# Patient Record
Sex: FEMALE | Race: WHITE | NOT HISPANIC OR LATINO | Employment: OTHER | URBAN - METROPOLITAN AREA
[De-identification: names, ages, dates, MRNs, and addresses within clinical notes are randomized per-mention and may not be internally consistent; named-entity substitution may affect disease eponyms.]

---

## 2023-01-01 ENCOUNTER — APPOINTMENT (OUTPATIENT)
Dept: CARDIOLOGY | Facility: HOSPITAL | Age: 88
DRG: 064 | End: 2023-01-01
Payer: COMMERCIAL

## 2023-01-01 ENCOUNTER — APPOINTMENT (OUTPATIENT)
Dept: RADIOLOGY | Facility: HOSPITAL | Age: 88
DRG: 064 | End: 2023-01-01
Payer: COMMERCIAL

## 2023-01-01 ENCOUNTER — HOSPITAL ENCOUNTER (INPATIENT)
Facility: HOSPITAL | Age: 88
LOS: 4 days | DRG: 064 | End: 2023-10-18
Attending: EMERGENCY MEDICINE | Admitting: INTERNAL MEDICINE
Payer: COMMERCIAL

## 2023-01-01 VITALS
TEMPERATURE: 98.4 F | DIASTOLIC BLOOD PRESSURE: 60 MMHG | BODY MASS INDEX: 23.98 KG/M2 | SYSTOLIC BLOOD PRESSURE: 129 MMHG | HEART RATE: 68 BPM | OXYGEN SATURATION: 90 % | HEIGHT: 63 IN | RESPIRATION RATE: 16 BRPM | WEIGHT: 135.36 LBS

## 2023-01-01 DIAGNOSIS — I63.9 CEREBROVASCULAR ACCIDENT (CVA), UNSPECIFIED MECHANISM (MULTI): Primary | ICD-10-CM

## 2023-01-01 LAB
ALBUMIN SERPL BCP-MCNC: 2.9 G/DL (ref 3.4–5)
ALBUMIN SERPL BCP-MCNC: 3.1 G/DL (ref 3.4–5)
ALP SERPL-CCNC: 158 U/L (ref 33–136)
ALP SERPL-CCNC: 169 U/L (ref 33–136)
ALT SERPL W P-5'-P-CCNC: 19 U/L (ref 7–45)
ALT SERPL W P-5'-P-CCNC: 24 U/L (ref 7–45)
ANION GAP BLDV CALCULATED.4IONS-SCNC: 10 MMOL/L (ref 10–25)
ANION GAP BLDV CALCULATED.4IONS-SCNC: 11 MMOL/L (ref 10–25)
ANION GAP SERPL CALC-SCNC: 12 MMOL/L (ref 10–20)
ANION GAP SERPL CALC-SCNC: 17 MMOL/L (ref 10–20)
ANION GAP SERPL CALC-SCNC: 17 MMOL/L (ref 10–20)
APPEARANCE UR: CLEAR
APTT PPP: 32 SECONDS (ref 27–38)
AST SERPL W P-5'-P-CCNC: 30 U/L (ref 9–39)
AST SERPL W P-5'-P-CCNC: 30 U/L (ref 9–39)
BASE EXCESS BLDV CALC-SCNC: 4.7 MMOL/L (ref -2–3)
BASE EXCESS BLDV CALC-SCNC: 5.8 MMOL/L (ref -2–3)
BASOPHILS # BLD AUTO: 0.05 X10*3/UL (ref 0–0.1)
BASOPHILS NFR BLD AUTO: 0.5 %
BILIRUB SERPL-MCNC: 0.7 MG/DL (ref 0–1.2)
BILIRUB SERPL-MCNC: 0.9 MG/DL (ref 0–1.2)
BILIRUB UR STRIP.AUTO-MCNC: NEGATIVE MG/DL
BNP SERPL-MCNC: 709 PG/ML (ref 0–99)
BODY TEMPERATURE: 37 DEGREES CELSIUS
BODY TEMPERATURE: 37 DEGREES CELSIUS
BUN SERPL-MCNC: 22 MG/DL (ref 6–23)
BUN SERPL-MCNC: 25 MG/DL (ref 6–23)
BUN SERPL-MCNC: 29 MG/DL (ref 6–23)
CA-I BLDV-SCNC: 1.1 MMOL/L (ref 1.1–1.33)
CA-I BLDV-SCNC: 1.15 MMOL/L (ref 1.1–1.33)
CALCIUM SERPL-MCNC: 8.7 MG/DL (ref 8.6–10.3)
CALCIUM SERPL-MCNC: 9 MG/DL (ref 8.6–10.3)
CALCIUM SERPL-MCNC: 9 MG/DL (ref 8.6–10.3)
CARDIAC TROPONIN I PNL SERPL HS: 24 NG/L (ref 0–13)
CHLORIDE BLDV-SCNC: 100 MMOL/L (ref 98–107)
CHLORIDE BLDV-SCNC: 99 MMOL/L (ref 98–107)
CHLORIDE SERPL-SCNC: 98 MMOL/L (ref 98–107)
CHOLEST SERPL-MCNC: 132 MG/DL (ref 0–199)
CHOLESTEROL/HDL RATIO: 3.3
CO2 SERPL-SCNC: 26 MMOL/L (ref 21–32)
CO2 SERPL-SCNC: 29 MMOL/L (ref 21–32)
CO2 SERPL-SCNC: 31 MMOL/L (ref 21–32)
COLOR UR: YELLOW
CREAT SERPL-MCNC: 0.78 MG/DL (ref 0.5–1.05)
CREAT SERPL-MCNC: 0.8 MG/DL (ref 0.5–1.05)
CREAT SERPL-MCNC: 0.82 MG/DL (ref 0.5–1.05)
EJECTION FRACTION APICAL 4 CHAMBER: 58.5
EOSINOPHIL # BLD AUTO: 0.23 X10*3/UL (ref 0–0.4)
EOSINOPHIL NFR BLD AUTO: 2.5 %
ERYTHROCYTE [DISTWIDTH] IN BLOOD BY AUTOMATED COUNT: 15.9 % (ref 11.5–14.5)
ERYTHROCYTE [DISTWIDTH] IN BLOOD BY AUTOMATED COUNT: 16.1 % (ref 11.5–14.5)
ERYTHROCYTE [DISTWIDTH] IN BLOOD BY AUTOMATED COUNT: 16.3 % (ref 11.5–14.5)
EST. AVERAGE GLUCOSE BLD GHB EST-MCNC: 143 MG/DL
FLOW: 3 LPM
GFR SERPL CREATININE-BSD FRML MDRD: 66 ML/MIN/1.73M*2
GFR SERPL CREATININE-BSD FRML MDRD: 68 ML/MIN/1.73M*2
GFR SERPL CREATININE-BSD FRML MDRD: 70 ML/MIN/1.73M*2
GLUCOSE BLD MANUAL STRIP-MCNC: 98 MG/DL (ref 74–99)
GLUCOSE BLDV-MCNC: 83 MG/DL (ref 74–99)
GLUCOSE BLDV-MCNC: 86 MG/DL (ref 74–99)
GLUCOSE SERPL-MCNC: 105 MG/DL (ref 74–99)
GLUCOSE SERPL-MCNC: 78 MG/DL (ref 74–99)
GLUCOSE SERPL-MCNC: 93 MG/DL (ref 74–99)
GLUCOSE UR STRIP.AUTO-MCNC: NEGATIVE MG/DL
HBA1C MFR BLD: 6.6 %
HCO3 BLDV-SCNC: 28.2 MMOL/L (ref 22–26)
HCO3 BLDV-SCNC: 29.2 MMOL/L (ref 22–26)
HCT VFR BLD AUTO: 32.8 % (ref 36–46)
HCT VFR BLD AUTO: 36.9 % (ref 36–46)
HCT VFR BLD AUTO: 40.4 % (ref 36–46)
HCT VFR BLD EST: 36 % (ref 36–46)
HCT VFR BLD EST: 38 % (ref 36–46)
HDLC SERPL-MCNC: 40.2 MG/DL
HGB BLD-MCNC: 10.4 G/DL (ref 12–16)
HGB BLD-MCNC: 11.9 G/DL (ref 12–16)
HGB BLD-MCNC: 12.8 G/DL (ref 12–16)
HGB BLDV-MCNC: 12.1 G/DL (ref 12–16)
HGB BLDV-MCNC: 12.6 G/DL (ref 12–16)
IMM GRANULOCYTES # BLD AUTO: 0.05 X10*3/UL (ref 0–0.5)
IMM GRANULOCYTES NFR BLD AUTO: 0.5 % (ref 0–0.9)
INHALED O2 CONCENTRATION: 32 %
INHALED O2 CONCENTRATION: 32 %
INR PPP: 1.1 (ref 0.9–1.1)
KETONES UR STRIP.AUTO-MCNC: NEGATIVE MG/DL
LACTATE BLDV-SCNC: 1.4 MMOL/L (ref 0.4–2)
LACTATE BLDV-SCNC: 1.4 MMOL/L (ref 0.4–2)
LDLC SERPL CALC-MCNC: 77 MG/DL
LEUKOCYTE ESTERASE UR QL STRIP.AUTO: ABNORMAL
LYMPHOCYTES # BLD AUTO: 1.79 X10*3/UL (ref 0.8–3)
LYMPHOCYTES NFR BLD AUTO: 19.1 %
MAGNESIUM SERPL-MCNC: 1.91 MG/DL (ref 1.6–2.4)
MAGNESIUM SERPL-MCNC: 1.98 MG/DL (ref 1.6–2.4)
MCH RBC QN AUTO: 28.2 PG (ref 26–34)
MCH RBC QN AUTO: 28.2 PG (ref 26–34)
MCH RBC QN AUTO: 28.5 PG (ref 26–34)
MCHC RBC AUTO-ENTMCNC: 31.7 G/DL (ref 32–36)
MCHC RBC AUTO-ENTMCNC: 31.7 G/DL (ref 32–36)
MCHC RBC AUTO-ENTMCNC: 32.2 G/DL (ref 32–36)
MCV RBC AUTO: 89 FL (ref 80–100)
MONOCYTES # BLD AUTO: 0.75 X10*3/UL (ref 0.05–0.8)
MONOCYTES NFR BLD AUTO: 8 %
NEUTROPHILS # BLD AUTO: 6.48 X10*3/UL (ref 1.6–5.5)
NEUTROPHILS NFR BLD AUTO: 69.4 %
NITRITE UR QL STRIP.AUTO: NEGATIVE
NON HDL CHOLESTEROL: 92 MG/DL (ref 0–149)
NRBC BLD-RTO: 0 /100 WBCS (ref 0–0)
OXYHGB MFR BLDV: 84.4 % (ref 45–75)
OXYHGB MFR BLDV: 95.3 % (ref 45–75)
PCO2 BLDV: 33 MM HG (ref 41–51)
PCO2 BLDV: 42 MM HG (ref 41–51)
PH BLDV: 7.45 PH (ref 7.33–7.43)
PH BLDV: 7.54 PH (ref 7.33–7.43)
PH UR STRIP.AUTO: 6 [PH]
PLATELET # BLD AUTO: 283 X10*3/UL (ref 150–450)
PLATELET # BLD AUTO: 303 X10*3/UL (ref 150–450)
PLATELET # BLD AUTO: 317 X10*3/UL (ref 150–450)
PMV BLD AUTO: 8.9 FL (ref 7.5–11.5)
PMV BLD AUTO: 9.1 FL (ref 7.5–11.5)
PMV BLD AUTO: 9.4 FL (ref 7.5–11.5)
PO2 BLDV: 57 MM HG (ref 35–45)
PO2 BLDV: 82 MM HG (ref 35–45)
POCT GLUCOSE: 129 MG/DL (ref 74–99)
POTASSIUM BLDV-SCNC: 3.4 MMOL/L (ref 3.5–5.3)
POTASSIUM BLDV-SCNC: 3.8 MMOL/L (ref 3.5–5.3)
POTASSIUM SERPL-SCNC: 3.4 MMOL/L (ref 3.5–5.3)
POTASSIUM SERPL-SCNC: 3.7 MMOL/L (ref 3.5–5.3)
POTASSIUM SERPL-SCNC: 3.7 MMOL/L (ref 3.5–5.3)
PROT SERPL-MCNC: 6.5 G/DL (ref 6.4–8.2)
PROT SERPL-MCNC: 6.8 G/DL (ref 6.4–8.2)
PROT UR STRIP.AUTO-MCNC: NEGATIVE MG/DL
PROTHROMBIN TIME: 12.9 SECONDS (ref 9.8–12.8)
RBC # BLD AUTO: 3.69 X10*6/UL (ref 4–5.2)
RBC # BLD AUTO: 4.17 X10*6/UL (ref 4–5.2)
RBC # BLD AUTO: 4.54 X10*6/UL (ref 4–5.2)
RBC # UR STRIP.AUTO: ABNORMAL /UL
RBC #/AREA URNS AUTO: NORMAL /HPF
SAO2 % BLDV: 87 % (ref 45–75)
SAO2 % BLDV: 98 % (ref 45–75)
SODIUM BLDV-SCNC: 134 MMOL/L (ref 136–145)
SODIUM BLDV-SCNC: 136 MMOL/L (ref 136–145)
SODIUM SERPL-SCNC: 135 MMOL/L (ref 136–145)
SODIUM SERPL-SCNC: 138 MMOL/L (ref 136–145)
SODIUM SERPL-SCNC: 142 MMOL/L (ref 136–145)
SP GR UR STRIP.AUTO: 1.01
TRIGL SERPL-MCNC: 72 MG/DL (ref 0–149)
UROBILINOGEN UR STRIP.AUTO-MCNC: <2 MG/DL
VLDL: 14 MG/DL (ref 0–40)
WBC # BLD AUTO: 13.7 X10*3/UL (ref 4.4–11.3)
WBC # BLD AUTO: 15 X10*3/UL (ref 4.4–11.3)
WBC # BLD AUTO: 9.4 X10*3/UL (ref 4.4–11.3)
WBC #/AREA URNS AUTO: NORMAL /HPF

## 2023-01-01 PROCEDURE — 82330 ASSAY OF CALCIUM: CPT | Performed by: STUDENT IN AN ORGANIZED HEALTH CARE EDUCATION/TRAINING PROGRAM

## 2023-01-01 PROCEDURE — 80061 LIPID PANEL: CPT | Performed by: INTERNAL MEDICINE

## 2023-01-01 PROCEDURE — 2060000001 HC INTERMEDIATE ICU ROOM DAILY

## 2023-01-01 PROCEDURE — 36415 COLL VENOUS BLD VENIPUNCTURE: CPT | Performed by: STUDENT IN AN ORGANIZED HEALTH CARE EDUCATION/TRAINING PROGRAM

## 2023-01-01 PROCEDURE — 97166 OT EVAL MOD COMPLEX 45 MIN: CPT | Mod: GO | Performed by: OCCUPATIONAL THERAPIST

## 2023-01-01 PROCEDURE — 83880 ASSAY OF NATRIURETIC PEPTIDE: CPT | Performed by: STUDENT IN AN ORGANIZED HEALTH CARE EDUCATION/TRAINING PROGRAM

## 2023-01-01 PROCEDURE — 2580000001 HC RX 258 IV SOLUTIONS: Performed by: INTERNAL MEDICINE

## 2023-01-01 PROCEDURE — 36415 COLL VENOUS BLD VENIPUNCTURE: CPT | Performed by: INTERNAL MEDICINE

## 2023-01-01 PROCEDURE — 2500000004 HC RX 250 GENERAL PHARMACY W/ HCPCS (ALT 636 FOR OP/ED): Performed by: INTERNAL MEDICINE

## 2023-01-01 PROCEDURE — 99223 1ST HOSP IP/OBS HIGH 75: CPT | Performed by: INTERNAL MEDICINE

## 2023-01-01 PROCEDURE — 87086 URINE CULTURE/COLONY COUNT: CPT | Mod: CMCLAB,PORLAB | Performed by: INTERNAL MEDICINE

## 2023-01-01 PROCEDURE — 2550000001 HC RX 255 CONTRASTS: Performed by: EMERGENCY MEDICINE

## 2023-01-01 PROCEDURE — 97162 PT EVAL MOD COMPLEX 30 MIN: CPT | Mod: GP

## 2023-01-01 PROCEDURE — 92610 EVALUATE SWALLOWING FUNCTION: CPT | Mod: GN | Performed by: PHARMACIST

## 2023-01-01 PROCEDURE — 81001 URINALYSIS AUTO W/SCOPE: CPT | Performed by: INTERNAL MEDICINE

## 2023-01-01 PROCEDURE — 80053 COMPREHEN METABOLIC PANEL: CPT | Performed by: EMERGENCY MEDICINE

## 2023-01-01 PROCEDURE — 70496 CT ANGIOGRAPHY HEAD: CPT | Mod: MG

## 2023-01-01 PROCEDURE — 71045 X-RAY EXAM CHEST 1 VIEW: CPT

## 2023-01-01 PROCEDURE — 70450 CT HEAD/BRAIN W/O DYE: CPT | Performed by: STUDENT IN AN ORGANIZED HEALTH CARE EDUCATION/TRAINING PROGRAM

## 2023-01-01 PROCEDURE — 87075 CULTR BACTERIA EXCEPT BLOOD: CPT | Mod: CMCLAB,PORLAB | Performed by: INTERNAL MEDICINE

## 2023-01-01 PROCEDURE — 70496 CT ANGIOGRAPHY HEAD: CPT | Performed by: RADIOLOGY

## 2023-01-01 PROCEDURE — 99233 SBSQ HOSP IP/OBS HIGH 50: CPT | Performed by: NURSE PRACTITIONER

## 2023-01-01 PROCEDURE — 71045 X-RAY EXAM CHEST 1 VIEW: CPT | Performed by: RADIOLOGY

## 2023-01-01 PROCEDURE — 83735 ASSAY OF MAGNESIUM: CPT | Performed by: INTERNAL MEDICINE

## 2023-01-01 PROCEDURE — 85610 PROTHROMBIN TIME: CPT | Performed by: EMERGENCY MEDICINE

## 2023-01-01 PROCEDURE — 99291 CRITICAL CARE FIRST HOUR: CPT | Performed by: EMERGENCY MEDICINE

## 2023-01-01 PROCEDURE — 99497 ADVNCD CARE PLAN 30 MIN: CPT | Performed by: INTERNAL MEDICINE

## 2023-01-01 PROCEDURE — 85027 COMPLETE CBC AUTOMATED: CPT | Performed by: INTERNAL MEDICINE

## 2023-01-01 PROCEDURE — 84295 ASSAY OF SERUM SODIUM: CPT | Performed by: STUDENT IN AN ORGANIZED HEALTH CARE EDUCATION/TRAINING PROGRAM

## 2023-01-01 PROCEDURE — 99233 SBSQ HOSP IP/OBS HIGH 50: CPT | Performed by: INTERNAL MEDICINE

## 2023-01-01 PROCEDURE — 99223 1ST HOSP IP/OBS HIGH 75: CPT | Performed by: PSYCHIATRY & NEUROLOGY

## 2023-01-01 PROCEDURE — 85025 COMPLETE CBC W/AUTO DIFF WBC: CPT | Performed by: EMERGENCY MEDICINE

## 2023-01-01 PROCEDURE — 87040 BLOOD CULTURE FOR BACTERIA: CPT | Mod: CMCLAB,PORLAB | Performed by: INTERNAL MEDICINE

## 2023-01-01 PROCEDURE — 93306 TTE W/DOPPLER COMPLETE: CPT

## 2023-01-01 PROCEDURE — 70450 CT HEAD/BRAIN W/O DYE: CPT | Performed by: RADIOLOGY

## 2023-01-01 PROCEDURE — 94762 N-INVAS EAR/PLS OXIMTRY CONT: CPT

## 2023-01-01 PROCEDURE — 70498 CT ANGIOGRAPHY NECK: CPT | Performed by: RADIOLOGY

## 2023-01-01 PROCEDURE — 80048 BASIC METABOLIC PNL TOTAL CA: CPT | Performed by: INTERNAL MEDICINE

## 2023-01-01 PROCEDURE — 93306 TTE W/DOPPLER COMPLETE: CPT | Performed by: INTERNAL MEDICINE

## 2023-01-01 PROCEDURE — 70498 CT ANGIOGRAPHY NECK: CPT | Mod: MG

## 2023-01-01 PROCEDURE — 83036 HEMOGLOBIN GLYCOSYLATED A1C: CPT | Performed by: INTERNAL MEDICINE

## 2023-01-01 PROCEDURE — 84295 ASSAY OF SERUM SODIUM: CPT | Performed by: INTERNAL MEDICINE

## 2023-01-01 PROCEDURE — 82805 BLOOD GASES W/O2 SATURATION: CPT | Performed by: STUDENT IN AN ORGANIZED HEALTH CARE EDUCATION/TRAINING PROGRAM

## 2023-01-01 PROCEDURE — 2500000004 HC RX 250 GENERAL PHARMACY W/ HCPCS (ALT 636 FOR OP/ED): Performed by: STUDENT IN AN ORGANIZED HEALTH CARE EDUCATION/TRAINING PROGRAM

## 2023-01-01 PROCEDURE — 85730 THROMBOPLASTIN TIME PARTIAL: CPT | Performed by: EMERGENCY MEDICINE

## 2023-01-01 PROCEDURE — 70450 CT HEAD/BRAIN W/O DYE: CPT | Mod: MG

## 2023-01-01 PROCEDURE — 36415 COLL VENOUS BLD VENIPUNCTURE: CPT | Performed by: EMERGENCY MEDICINE

## 2023-01-01 PROCEDURE — 1210000001 HC SEMI-PRIVATE ROOM DAILY

## 2023-01-01 PROCEDURE — 84484 ASSAY OF TROPONIN QUANT: CPT | Performed by: EMERGENCY MEDICINE

## 2023-01-01 PROCEDURE — 82947 ASSAY GLUCOSE BLOOD QUANT: CPT

## 2023-01-01 RX ORDER — CLOPIDOGREL BISULFATE 75 MG/1
75 TABLET ORAL DAILY
Status: DISCONTINUED | OUTPATIENT
Start: 2023-01-01 | End: 2023-01-01

## 2023-01-01 RX ORDER — FLUTICASONE PROPIONATE AND SALMETEROL 500; 50 UG/1; UG/1
1 POWDER RESPIRATORY (INHALATION)
COMMUNITY

## 2023-01-01 RX ORDER — LABETALOL HYDROCHLORIDE 5 MG/ML
10 INJECTION, SOLUTION INTRAVENOUS EVERY 10 MIN PRN
Status: ACTIVE | OUTPATIENT
Start: 2023-01-01 | End: 2023-01-01

## 2023-01-01 RX ORDER — SERTRALINE HYDROCHLORIDE 50 MG/1
50 TABLET, FILM COATED ORAL DAILY
COMMUNITY

## 2023-01-01 RX ORDER — FERROUS SULFATE 325(65) MG
65 TABLET ORAL
COMMUNITY

## 2023-01-01 RX ORDER — CEFTRIAXONE 1 G/50ML
1 INJECTION, SOLUTION INTRAVENOUS EVERY 24 HOURS
Status: DISCONTINUED | OUTPATIENT
Start: 2023-01-01 | End: 2023-01-01

## 2023-01-01 RX ORDER — LORAZEPAM 2 MG/ML
2 INJECTION INTRAMUSCULAR
Status: DISCONTINUED | OUTPATIENT
Start: 2023-01-01 | End: 2023-10-18 | Stop reason: HOSPADM

## 2023-01-01 RX ORDER — VIBEGRON 75 MG/1
1 TABLET, FILM COATED ORAL DAILY
COMMUNITY

## 2023-01-01 RX ORDER — FUROSEMIDE 10 MG/ML
20 INJECTION INTRAMUSCULAR; INTRAVENOUS EVERY 12 HOURS
Status: DISCONTINUED | OUTPATIENT
Start: 2023-01-01 | End: 2023-01-01

## 2023-01-01 RX ORDER — CLOPIDOGREL BISULFATE 75 MG/1
75 TABLET ORAL DAILY
COMMUNITY

## 2023-01-01 RX ORDER — OMEPRAZOLE 20 MG/1
20 CAPSULE, DELAYED RELEASE ORAL
COMMUNITY

## 2023-01-01 RX ORDER — LORAZEPAM 2 MG/ML
1 INJECTION INTRAMUSCULAR
Status: DISCONTINUED | OUTPATIENT
Start: 2023-01-01 | End: 2023-10-18 | Stop reason: HOSPADM

## 2023-01-01 RX ORDER — SIMETHICONE 80 MG
80 TABLET,CHEWABLE ORAL EVERY 6 HOURS
COMMUNITY

## 2023-01-01 RX ORDER — MORPHINE SULFATE 2 MG/ML
2 INJECTION, SOLUTION INTRAMUSCULAR; INTRAVENOUS ONCE
Status: COMPLETED | OUTPATIENT
Start: 2023-01-01 | End: 2023-01-01

## 2023-01-01 RX ORDER — CETIRIZINE HYDROCHLORIDE 10 MG/1
10 TABLET ORAL DAILY PRN
COMMUNITY

## 2023-01-01 RX ORDER — FUROSEMIDE 40 MG/1
40 TABLET ORAL DAILY
COMMUNITY

## 2023-01-01 RX ORDER — POLYETHYLENE GLYCOL 3350 17 G/17G
17 POWDER, FOR SOLUTION ORAL DAILY
COMMUNITY

## 2023-01-01 RX ORDER — ATORVASTATIN CALCIUM 40 MG/1
80 TABLET, FILM COATED ORAL NIGHTLY
Status: DISCONTINUED | OUTPATIENT
Start: 2023-01-01 | End: 2023-01-01

## 2023-01-01 RX ORDER — FUROSEMIDE 10 MG/ML
20 INJECTION INTRAMUSCULAR; INTRAVENOUS ONCE
Status: COMPLETED | OUTPATIENT
Start: 2023-01-01 | End: 2023-01-01

## 2023-01-01 RX ORDER — POTASSIUM CHLORIDE 20 MEQ/1
20 TABLET, EXTENDED RELEASE ORAL DAILY
COMMUNITY

## 2023-01-01 RX ORDER — ACETAMINOPHEN 325 MG/1
2 TABLET ORAL NIGHTLY
COMMUNITY

## 2023-01-01 RX ORDER — HYDROMORPHONE HYDROCHLORIDE 1 MG/ML
1 INJECTION, SOLUTION INTRAMUSCULAR; INTRAVENOUS; SUBCUTANEOUS
Status: DISCONTINUED | OUTPATIENT
Start: 2023-01-01 | End: 2023-10-18 | Stop reason: HOSPADM

## 2023-01-01 RX ORDER — GLYCOPYRROLATE 0.2 MG/ML
0.2 INJECTION INTRAMUSCULAR; INTRAVENOUS EVERY 4 HOURS PRN
Status: DISCONTINUED | OUTPATIENT
Start: 2023-01-01 | End: 2023-10-18 | Stop reason: HOSPADM

## 2023-01-01 RX ORDER — LEVOTHYROXINE SODIUM 25 UG/1
25 TABLET ORAL
COMMUNITY

## 2023-01-01 RX ORDER — DOCUSATE SODIUM 100 MG/1
100 CAPSULE, LIQUID FILLED ORAL 2 TIMES DAILY
COMMUNITY

## 2023-01-01 RX ORDER — LANOLIN ALCOHOL/MO/W.PET/CERES
1000 CREAM (GRAM) TOPICAL DAILY
COMMUNITY

## 2023-01-01 RX ORDER — HYDRALAZINE HYDROCHLORIDE 25 MG/1
25 TABLET, FILM COATED ORAL EVERY 6 HOURS PRN
Status: DISCONTINUED | OUTPATIENT
Start: 2023-01-01 | End: 2023-01-01

## 2023-01-01 RX ORDER — SODIUM CHLORIDE, SODIUM LACTATE, POTASSIUM CHLORIDE, CALCIUM CHLORIDE 600; 310; 30; 20 MG/100ML; MG/100ML; MG/100ML; MG/100ML
75 INJECTION, SOLUTION INTRAVENOUS CONTINUOUS
Status: DISCONTINUED | OUTPATIENT
Start: 2023-01-01 | End: 2023-01-01

## 2023-01-01 RX ORDER — ACETAMINOPHEN 325 MG/1
325 TABLET ORAL EVERY 4 HOURS PRN
COMMUNITY

## 2023-01-01 RX ORDER — POLYETHYLENE GLYCOL 3350 17 G/17G
17 POWDER, FOR SOLUTION ORAL DAILY PRN
Status: DISCONTINUED | OUTPATIENT
Start: 2023-01-01 | End: 2023-01-01

## 2023-01-01 RX ORDER — HYDRALAZINE HYDROCHLORIDE 20 MG/ML
10 INJECTION INTRAMUSCULAR; INTRAVENOUS
Status: ACTIVE | OUTPATIENT
Start: 2023-01-01 | End: 2023-01-01

## 2023-01-01 RX ORDER — TIOTROPIUM BROMIDE 18 UG/1
1 CAPSULE ORAL; RESPIRATORY (INHALATION)
COMMUNITY

## 2023-01-01 RX ORDER — POTASSIUM CHLORIDE 14.9 MG/ML
20 INJECTION INTRAVENOUS ONCE
Status: COMPLETED | OUTPATIENT
Start: 2023-01-01 | End: 2023-01-01

## 2023-01-01 RX ORDER — ASPIRIN 81 MG/1
81 TABLET ORAL DAILY
Status: DISCONTINUED | OUTPATIENT
Start: 2023-01-01 | End: 2023-01-01

## 2023-01-01 RX ADMIN — HYDROMORPHONE HYDROCHLORIDE 1 MG: 1 INJECTION, SOLUTION INTRAMUSCULAR; INTRAVENOUS; SUBCUTANEOUS at 16:28

## 2023-01-01 RX ADMIN — FUROSEMIDE 20 MG: 10 INJECTION, SOLUTION INTRAMUSCULAR; INTRAVENOUS at 23:14

## 2023-01-01 RX ADMIN — POTASSIUM CHLORIDE 20 MEQ: 14.9 INJECTION, SOLUTION INTRAVENOUS at 10:58

## 2023-01-01 RX ADMIN — FUROSEMIDE 20 MG: 10 INJECTION, SOLUTION INTRAMUSCULAR; INTRAVENOUS at 11:03

## 2023-01-01 RX ADMIN — CEFTRIAXONE SODIUM 1 G: 1 INJECTION, SOLUTION INTRAVENOUS at 08:34

## 2023-01-01 RX ADMIN — FUROSEMIDE 20 MG: 10 INJECTION, SOLUTION INTRAMUSCULAR; INTRAVENOUS at 00:03

## 2023-01-01 RX ADMIN — FUROSEMIDE 20 MG: 10 INJECTION, SOLUTION INTRAMUSCULAR; INTRAVENOUS at 10:58

## 2023-01-01 RX ADMIN — MORPHINE SULFATE 2 MG: 2 INJECTION, SOLUTION INTRAMUSCULAR; INTRAVENOUS at 02:35

## 2023-01-01 RX ADMIN — LORAZEPAM 1 MG: 2 INJECTION INTRAMUSCULAR; INTRAVENOUS at 16:27

## 2023-01-01 RX ADMIN — SODIUM CHLORIDE, POTASSIUM CHLORIDE, SODIUM LACTATE AND CALCIUM CHLORIDE 75 ML/HR: 600; 310; 30; 20 INJECTION, SOLUTION INTRAVENOUS at 14:45

## 2023-01-01 RX ADMIN — FUROSEMIDE 20 MG: 10 INJECTION, SOLUTION INTRAMUSCULAR; INTRAVENOUS at 22:09

## 2023-01-01 RX ADMIN — LORAZEPAM 1 MG: 2 INJECTION INTRAMUSCULAR; INTRAVENOUS at 16:57

## 2023-01-01 RX ADMIN — IOHEXOL 50 ML: 350 INJECTION, SOLUTION INTRAVENOUS at 09:20

## 2023-01-01 RX ADMIN — LORAZEPAM 1 MG: 2 INJECTION INTRAMUSCULAR; INTRAVENOUS at 13:44

## 2023-01-01 RX ADMIN — GLYCOPYRROLATE 0.2 MG: 0.2 INJECTION, SOLUTION INTRAMUSCULAR; INTRAVENOUS at 20:16

## 2023-01-01 SDOH — SOCIAL STABILITY: SOCIAL INSECURITY: HAVE YOU HAD THOUGHTS OF HARMING ANYONE ELSE?: UNABLE TO ASSESS

## 2023-01-01 SDOH — SOCIAL STABILITY: SOCIAL INSECURITY: DO YOU FEEL ANYONE HAS EXPLOITED OR TAKEN ADVANTAGE OF YOU FINANCIALLY OR OF YOUR PERSONAL PROPERTY?: UNABLE TO ASSESS

## 2023-01-01 SDOH — SOCIAL STABILITY: SOCIAL INSECURITY: WERE YOU ABLE TO COMPLETE ALL THE BEHAVIORAL HEALTH SCREENINGS?: NO

## 2023-01-01 SDOH — SOCIAL STABILITY: SOCIAL INSECURITY: HAS ANYONE EVER THREATENED TO HURT YOUR FAMILY OR YOUR PETS?: UNABLE TO ASSESS

## 2023-01-01 SDOH — SOCIAL STABILITY: SOCIAL INSECURITY: ARE THERE ANY APPARENT SIGNS OF INJURIES/BEHAVIORS THAT COULD BE RELATED TO ABUSE/NEGLECT?: UNABLE TO ASSESS

## 2023-01-01 SDOH — SOCIAL STABILITY: SOCIAL INSECURITY: DO YOU FEEL UNSAFE GOING BACK TO THE PLACE WHERE YOU ARE LIVING?: UNABLE TO ASSESS

## 2023-01-01 SDOH — SOCIAL STABILITY: SOCIAL INSECURITY: ABUSE: ADULT

## 2023-01-01 SDOH — SOCIAL STABILITY: SOCIAL INSECURITY: DOES ANYONE TRY TO KEEP YOU FROM HAVING/CONTACTING OTHER FRIENDS OR DOING THINGS OUTSIDE YOUR HOME?: UNABLE TO ASSESS

## 2023-01-01 SDOH — SOCIAL STABILITY: SOCIAL INSECURITY: ARE YOU OR HAVE YOU BEEN THREATENED OR ABUSED PHYSICALLY, EMOTIONALLY, OR SEXUALLY BY ANYONE?: UNABLE TO ASSESS

## 2023-01-01 ASSESSMENT — PAIN SCALES - PAIN ASSESSMENT IN ADVANCED DEMENTIA (PAINAD)
BREATHING: NORMAL
TOTALSCORE: 0
CONSOLABILITY: NO NEED TO CONSOLE
FACIALEXPRESSION: SMILING OR INEXPRESSIVE
CONSOLABILITY: NO NEED TO CONSOLE
TOTALSCORE: 1
BREATHING: NORMAL
TOTALSCORE: MEDICATION (SEE MAR)
BODYLANGUAGE: RELAXED
BREATHING: OCCASIONAL LABORED BREATHING, SHORT PERIOD OF HYPERVENTILATION
FACIALEXPRESSION: SMILING OR INEXPRESSIVE
BODYLANGUAGE: TENSE, DISTRESSED PACING, FIDGETING
FACIALEXPRESSION: SMILING OR INEXPRESSIVE
CONSOLABILITY: NO NEED TO CONSOLE
TOTALSCORE: UNABLE TO CONSOLE, DISTRACT OR REASSURE
BODYLANGUAGE: RELAXED
FACIALEXPRESSION: SMILING OR INEXPRESSIVE
TOTALSCORE: 0
CONSOLABILITY: NO NEED TO CONSOLE
BODYLANGUAGE: RELAXED
NEGVOCALIZATION: OCCASIONAL MOAN/GROAN, LOW SPEECH, NEGATIVE/DISAPPROVING QUALITY
BODYLANGUAGE: RIGID, FISTS CLENCHED, KNEES UP, PUSHING/PULLING AWAY, STRIKES OUT
BODYLANGUAGE: RELAXED
TOTALSCORE: 3
BODYLANGUAGE: RELAXED
BODYLANGUAGE: RELAXED
CONSOLABILITY: NO NEED TO CONSOLE
FACIALEXPRESSION: SMILING OR INEXPRESSIVE
CONSOLABILITY: UNABLE TO CONSOLE, DISTRACT OR REASSURE
TOTALSCORE: 1
TOTALSCORE: 10
BREATHING: NORMAL
BREATHING: OCCASIONAL LABORED BREATHING, SHORT PERIOD OF HYPERVENTILATION
BREATHING: NOISY LABORED BREATHING, LONG PERIODS OF HYPERVENTILATION, CHEYNE-STOKES RESPIRATIONS
CONSOLABILITY: NO NEED TO CONSOLE
BODYLANGUAGE: RELAXED
FACIALEXPRESSION: SMILING OR INEXPRESSIVE
BREATHING: SMILING OR INEXPRESSIVE
BREATHING: NORMAL
TOTALSCORE: 0
TOTALSCORE: 0
CONSOLABILITY: NO NEED TO CONSOLE
TOTALSCORE: 1
TOTALSCORE: 2
FACIALEXPRESSION: SMILING OR INEXPRESSIVE
TOTALSCORE: 4
FACIALEXPRESSION: SMILING OR INEXPRESSIVE
BREATHING: NORMAL
BREATHING: OCCASIONAL LABORED BREATHING, SHORT PERIOD OF HYPERVENTILATION
FACIALEXPRESSION: FACIAL GRIMACING
BODYLANGUAGE: RELAXED
FACIALEXPRESSION: OCCASIONAL MOAN/GROAN, LOW SPEECH, NEGATIVE/DISAPPROVING QUALITY
TOTALSCORE: RELAXATION TECHNIQUE;REST;REPOSITIONED
BREATHING: NOISY LABORED BREATHING, LONG PERIODS OF HYPERVENTILATION, CHEYNE-STOKES RESPIRATIONS
FACIALEXPRESSION: SMILING OR INEXPRESSIVE
NEGVOCALIZATION: REPEATED TROUBLED CALLING OUT, LOUD MOANING/GROANING, CRYING
BODYLANGUAGE: TENSE, DISTRESSED PACING, FIDGETING
BREATHING: OCCASIONAL LABORED BREATHING, SHORT PERIOD OF HYPERVENTILATION

## 2023-01-01 ASSESSMENT — COGNITIVE AND FUNCTIONAL STATUS - GENERAL
DAILY ACTIVITIY SCORE: 6
TURNING FROM BACK TO SIDE WHILE IN FLAT BAD: TOTAL
MOVING FROM LYING ON BACK TO SITTING ON SIDE OF FLAT BED WITH BEDRAILS: TOTAL
PATIENT BASELINE BEDBOUND: YES
EATING MEALS: TOTAL
DRESSING REGULAR UPPER BODY CLOTHING: TOTAL
DRESSING REGULAR LOWER BODY CLOTHING: TOTAL
DRESSING REGULAR UPPER BODY CLOTHING: TOTAL
TOILETING: TOTAL
CLIMB 3 TO 5 STEPS WITH RAILING: TOTAL
HELP NEEDED FOR BATHING: TOTAL
WALKING IN HOSPITAL ROOM: TOTAL
STANDING UP FROM CHAIR USING ARMS: TOTAL
HELP NEEDED FOR BATHING: TOTAL
MOBILITY SCORE: 6
MOBILITY SCORE: 6
MOVING TO AND FROM BED TO CHAIR: TOTAL
MOVING TO AND FROM BED TO CHAIR: TOTAL
WALKING IN HOSPITAL ROOM: TOTAL
STANDING UP FROM CHAIR USING ARMS: TOTAL
PERSONAL GROOMING: TOTAL
MOVING TO AND FROM BED TO CHAIR: TOTAL
PERSONAL GROOMING: TOTAL
MOVING TO AND FROM BED TO CHAIR: TOTAL
CLIMB 3 TO 5 STEPS WITH RAILING: TOTAL
DRESSING REGULAR LOWER BODY CLOTHING: TOTAL
STANDING UP FROM CHAIR USING ARMS: TOTAL
EATING MEALS: TOTAL
CLIMB 3 TO 5 STEPS WITH RAILING: TOTAL
MOVING TO AND FROM BED TO CHAIR: TOTAL
STANDING UP FROM CHAIR USING ARMS: TOTAL
CLIMB 3 TO 5 STEPS WITH RAILING: TOTAL
PERSONAL GROOMING: TOTAL
DAILY ACTIVITIY SCORE: 6
TURNING FROM BACK TO SIDE WHILE IN FLAT BAD: TOTAL
EATING MEALS: TOTAL
MOVING TO AND FROM BED TO CHAIR: TOTAL
CLIMB 3 TO 5 STEPS WITH RAILING: TOTAL
MOVING FROM LYING ON BACK TO SITTING ON SIDE OF FLAT BED WITH BEDRAILS: TOTAL
WALKING IN HOSPITAL ROOM: TOTAL
DRESSING REGULAR UPPER BODY CLOTHING: TOTAL
DAILY ACTIVITIY SCORE: 6
TURNING FROM BACK TO SIDE WHILE IN FLAT BAD: TOTAL
WALKING IN HOSPITAL ROOM: TOTAL
PERSONAL GROOMING: TOTAL
DRESSING REGULAR LOWER BODY CLOTHING: TOTAL
PERSONAL GROOMING: TOTAL
PERSONAL GROOMING: TOTAL
CLIMB 3 TO 5 STEPS WITH RAILING: TOTAL
TOILETING: TOTAL
MOVING FROM LYING ON BACK TO SITTING ON SIDE OF FLAT BED WITH BEDRAILS: TOTAL
EATING MEALS: TOTAL
MOBILITY SCORE: 6
WALKING IN HOSPITAL ROOM: TOTAL
HELP NEEDED FOR BATHING: TOTAL
HELP NEEDED FOR BATHING: TOTAL
TOILETING: TOTAL
DRESSING REGULAR LOWER BODY CLOTHING: TOTAL
STANDING UP FROM CHAIR USING ARMS: TOTAL
DAILY ACTIVITIY SCORE: 6
TURNING FROM BACK TO SIDE WHILE IN FLAT BAD: TOTAL
CLIMB 3 TO 5 STEPS WITH RAILING: TOTAL
STANDING UP FROM CHAIR USING ARMS: TOTAL
HELP NEEDED FOR BATHING: TOTAL
EATING MEALS: TOTAL
HELP NEEDED FOR BATHING: TOTAL
WALKING IN HOSPITAL ROOM: TOTAL
TOILETING: TOTAL
TURNING FROM BACK TO SIDE WHILE IN FLAT BAD: TOTAL
STANDING UP FROM CHAIR USING ARMS: TOTAL
DRESSING REGULAR LOWER BODY CLOTHING: TOTAL
MOVING FROM LYING ON BACK TO SITTING ON SIDE OF FLAT BED WITH BEDRAILS: TOTAL
MOVING FROM LYING ON BACK TO SITTING ON SIDE OF FLAT BED WITH BEDRAILS: TOTAL
CLIMB 3 TO 5 STEPS WITH RAILING: TOTAL
DRESSING REGULAR UPPER BODY CLOTHING: TOTAL
MOVING FROM LYING ON BACK TO SITTING ON SIDE OF FLAT BED WITH BEDRAILS: TOTAL
MOVING TO AND FROM BED TO CHAIR: TOTAL
WALKING IN HOSPITAL ROOM: TOTAL
TURNING FROM BACK TO SIDE WHILE IN FLAT BAD: TOTAL
EATING MEALS: TOTAL
MOBILITY SCORE: 6
MOBILITY SCORE: 6
EATING MEALS: TOTAL
MOVING FROM LYING ON BACK TO SITTING ON SIDE OF FLAT BED WITH BEDRAILS: TOTAL
TURNING FROM BACK TO SIDE WHILE IN FLAT BAD: TOTAL
WALKING IN HOSPITAL ROOM: TOTAL
MOBILITY SCORE: 6
HELP NEEDED FOR BATHING: TOTAL
DRESSING REGULAR LOWER BODY CLOTHING: TOTAL
MOVING TO AND FROM BED TO CHAIR: TOTAL
TOILETING: TOTAL
PATIENT BASELINE BEDBOUND: UNABLE TO ASSESS AT THIS TIME
DAILY ACTIVITIY SCORE: 6
MOVING FROM LYING ON BACK TO SITTING ON SIDE OF FLAT BED WITH BEDRAILS: TOTAL
DRESSING REGULAR UPPER BODY CLOTHING: TOTAL
DAILY ACTIVITIY SCORE: 6
PERSONAL GROOMING: TOTAL
TOILETING: TOTAL
WALKING IN HOSPITAL ROOM: TOTAL
MOVING FROM LYING ON BACK TO SITTING ON SIDE OF FLAT BED WITH BEDRAILS: TOTAL
STANDING UP FROM CHAIR USING ARMS: TOTAL
TURNING FROM BACK TO SIDE WHILE IN FLAT BAD: TOTAL
DRESSING REGULAR LOWER BODY CLOTHING: TOTAL
STANDING UP FROM CHAIR USING ARMS: TOTAL
TOILETING: TOTAL
CLIMB 3 TO 5 STEPS WITH RAILING: TOTAL
TURNING FROM BACK TO SIDE WHILE IN FLAT BAD: TOTAL
MOBILITY SCORE: 6
DRESSING REGULAR UPPER BODY CLOTHING: TOTAL
MOVING TO AND FROM BED TO CHAIR: TOTAL
DAILY ACTIVITIY SCORE: 6
DRESSING REGULAR UPPER BODY CLOTHING: TOTAL

## 2023-01-01 ASSESSMENT — ACTIVITIES OF DAILY LIVING (ADL)
DRESSING YOURSELF: UNABLE TO ASSESS
DRESSING YOURSELF: UNABLE TO ASSESS
HEARING - RIGHT EAR: UNABLE TO ASSESS
WALKS IN HOME: UNABLE TO ASSESS
JUDGMENT_ADEQUATE_SAFELY_COMPLETE_DAILY_ACTIVITIES: NO
FEEDING YOURSELF: UNABLE TO ASSESS
HEARING - LEFT EAR: UNABLE TO ASSESS
HEARING - LEFT EAR: FUNCTIONAL
JUDGMENT_ADEQUATE_SAFELY_COMPLETE_DAILY_ACTIVITIES: UNABLE TO ASSESS
HEARING - LEFT EAR: UNABLE TO ASSESS
WALKS IN HOME: DEPENDENT
FEEDING YOURSELF: DEPENDENT
GROOMING: DEPENDENT
PATIENT'S MEMORY ADEQUATE TO SAFELY COMPLETE DAILY ACTIVITIES?: NO
TOILETING: UNABLE TO ASSESS
BATHING: UNABLE TO ASSESS
ASSISTIVE_DEVICE: OTHER (COMMENT)
PATIENT'S MEMORY ADEQUATE TO SAFELY COMPLETE DAILY ACTIVITIES?: UNABLE TO ASSESS
ASSISTIVE_DEVICE: OTHER (COMMENT)
ASSISTIVE_DEVICE: OTHER (COMMENT)
FEEDING YOURSELF: UNABLE TO ASSESS
BATHING: UNABLE TO ASSESS
JUDGMENT_ADEQUATE_SAFELY_COMPLETE_DAILY_ACTIVITIES: UNABLE TO ASSESS
GROOMING: UNABLE TO ASSESS
TOILETING: DEPENDENT
DRESSING YOURSELF: DEPENDENT
ADEQUATE_TO_COMPLETE_ADL: NO
HEARING - RIGHT EAR: UNABLE TO ASSESS
TOILETING: UNABLE TO ASSESS
ADEQUATE_TO_COMPLETE_ADL: UNABLE TO ASSESS
BATHING: DEPENDENT
LACK_OF_TRANSPORTATION: PATIENT DECLINED
PATIENT'S MEMORY ADEQUATE TO SAFELY COMPLETE DAILY ACTIVITIES?: UNABLE TO ASSESS
GROOMING: UNABLE TO ASSESS
ADEQUATE_TO_COMPLETE_ADL: UNABLE TO ASSESS
HEARING - RIGHT EAR: FUNCTIONAL

## 2023-01-01 ASSESSMENT — LIFESTYLE VARIABLES
SKIP TO QUESTIONS 9-10: 1
HOW MANY STANDARD DRINKS CONTAINING ALCOHOL DO YOU HAVE ON A TYPICAL DAY: PATIENT DECLINED
AUDIT-C TOTAL SCORE: 0
SKIP TO QUESTIONS 9-10: 0
HOW OFTEN DO YOU HAVE A DRINK CONTAINING ALCOHOL: PATIENT DECLINED
HOW OFTEN DO YOU HAVE 6 OR MORE DRINKS ON ONE OCCASION: PATIENT DECLINED
SUBSTANCE_ABUSE_PAST_12_MONTHS: NO
HOW OFTEN DURING THE LAST YEAR HAVE YOU FAILED TO DO WHAT WAS NORMALLY EXPECTED FROM YOU BECAUSE OF DRINKING: NEVER
HOW MANY STANDARD DRINKS CONTAINING ALCOHOL DO YOU HAVE ON A TYPICAL DAY: PATIENT DOES NOT DRINK
AUDIT-C TOTAL SCORE: -1
HOW OFTEN DO YOU HAVE A DRINK CONTAINING ALCOHOL: NEVER
HOW OFTEN DURING THE LAST YEAR HAVE YOU FOUND THAT YOU WERE NOT ABLE TO STOP DRINKING ONCE YOU HAD STARTED: NEVER
PRESCIPTION_ABUSE_PAST_12_MONTHS: NO
AUDIT-C TOTAL SCORE: -1
HOW OFTEN DO YOU HAVE SIX OR MORE DRINKS ON ONE OCCASION: NEVER

## 2023-01-01 ASSESSMENT — PAIN - FUNCTIONAL ASSESSMENT
PAIN_FUNCTIONAL_ASSESSMENT: UNABLE TO SELF-REPORT
PAIN_FUNCTIONAL_ASSESSMENT: PAINAD (PAIN ASSESSMENT IN ADVANCED DEMENTIA SCALE)
PAIN_FUNCTIONAL_ASSESSMENT: WONG-BAKER FACES

## 2023-10-14 PROBLEM — I63.9 CEREBROVASCULAR ACCIDENT (CVA), UNSPECIFIED MECHANISM (MULTI): Status: ACTIVE | Noted: 2023-01-01

## 2023-10-14 PROBLEM — I25.10 CORONARY ARTERY DISEASE INVOLVING NATIVE CORONARY ARTERY OF NATIVE HEART WITHOUT ANGINA PECTORIS: Chronic | Status: ACTIVE | Noted: 2023-01-01

## 2023-10-14 PROBLEM — I50.20 HFREF (HEART FAILURE WITH REDUCED EJECTION FRACTION) (MULTI): Chronic | Status: ACTIVE | Noted: 2023-01-01

## 2023-10-14 PROBLEM — I48.0 PAROXYSMAL ATRIAL FIBRILLATION (MULTI): Chronic | Status: ACTIVE | Noted: 2023-01-01

## 2023-10-14 PROBLEM — E03.9 ACQUIRED HYPOTHYROIDISM: Chronic | Status: ACTIVE | Noted: 2023-01-01

## 2023-10-14 PROBLEM — G81.91 RIGHT HEMIPLEGIA (MULTI): Status: ACTIVE | Noted: 2023-01-01

## 2023-10-14 PROBLEM — J43.2 CENTRILOBULAR EMPHYSEMA (MULTI): Chronic | Status: ACTIVE | Noted: 2023-01-01

## 2023-10-14 PROBLEM — K21.9 GASTROESOPHAGEAL REFLUX DISEASE WITHOUT ESOPHAGITIS: Chronic | Status: ACTIVE | Noted: 2023-01-01

## 2023-10-14 PROBLEM — F33.0 MILD EPISODE OF RECURRENT MAJOR DEPRESSIVE DISORDER (CMS-HCC): Chronic | Status: ACTIVE | Noted: 2023-01-01

## 2023-10-14 PROBLEM — N32.81 OVERACTIVE BLADDER: Chronic | Status: ACTIVE | Noted: 2023-01-01

## 2023-10-14 PROBLEM — E11.9 TYPE 2 DIABETES MELLITUS WITHOUT COMPLICATION, WITHOUT LONG-TERM CURRENT USE OF INSULIN (MULTI): Chronic | Status: ACTIVE | Noted: 2023-01-01
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
## 2023-10-14 NOTE — H&P
History Of Present Illness  Lauren Reyna is a 89 y.o. 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    Patient resides at [facility]. Per her son she was in her usual state of health until yesterday morning when she began acting confused. Normally she is able to converse and is AOx3. She is wheelchair bound and requires assistance for transfers. This morning she was found to be somnolent and with what appeared to be R sided paralysis. She was brought to the ED. Her NIH score was 25 in the ED. She was out of the window for thrombolytics. On my interview she is extremely somnolent and only opens eyes to voice then immediately goes back to sleep.      I did confirm with patients son Félix patient is DNR/DNI.      Past Medical History  She has no past medical history on file.    Surgical History  She has no past surgical history on file.     Social History  She has no history on file for tobacco use, alcohol use, and drug use.    Family History  No family history on file.     Allergies  Lisinopril    Code Status  No Order     Review of Systems   Reason unable to perform ROS: patient with aphasia.     Last Recorded Vitals  BP (!) 147/96   Pulse 60   Temp 36.8 °C (98.3 °F) (Temporal)   Resp 16   Wt 68.9 kg (151 lb 14.4 oz)   SpO2 99%      Gen: Not in acute distress  Head/Neck: NCAT  Eyes: Anicteric sclerae, noninjected conjunctivae  Nose: No rhinorrhea  Mouth:  MMM  Heart: Regular rate and rhythm w/ no murmurs, rubs, gallops  Lungs: CTAB w/o wheezes, rales, rhonchi, no increased work of breathing  Abdomen: Soft, NT/ND  Musculoskeletal: No deformities  Extremities: No edema.  Neurologic: Please see below for NIHSS  Skin: No rashes  noted  Psychological: Calm        Interval: Baseline  Time: 9:03 AM  Person Administering Scale: Prince Tee MD     1a         Level of consciousness:           1=not alert but arousable by minor stimulation to obey, answer or respond  1b.        LOC questions:            2=Performs neither task correctly  1c.        LOC commands:          2=Performs neither task correctly  2.          Best Gaze:        1=partial gaze palsy  3.          Visual:  1=Partial hemianopia  4.          Facial Palsy:      1=Minor paralysis (flattened nasolabial fold, asymmetric on smiling)  5a.        Motor left arm:            1=Drift, limb holds 90 (or 45) degrees but drifts down before full 10 seconds: does not hit bed  5b.        Motor right arm:          4=No movement  6a.        motor left le=Some effort against gravity, limb cannot get to or maintain (if cured) 90 (or 45) degrees, drifts down to bed, but has some effort against gravity  6b        Motor right leg:           3=No effort against gravity, limb falls  7.          Limb Ataxia:     0  8.          Sensory:           2=Severe to total sensory loss; patient is not aware of being touched in face, arm, leg  9.          Best Language:            3=Mute, global aphasia; no usable speech or auditory comprehension  10.        Dysarthria:       2=Severe; patient speech is so slurred as to be unintelligible in the absence of or our of proportion to any dysphagia, or is mute/anarthric  11.        Extinction and Inattention:      0=No abnormality  12.        Distal motor function: 0=Normal               Total:     25    Relevant Results  Results for orders placed or performed during the hospital encounter of 10/14/23 (from the past 24 hour(s))   CBC and Auto Differential   Result Value Ref Range    WBC 9.4 4.4 - 11.3 x10*3/uL    nRBC 0.0 0.0 - 0.0 /100 WBCs    RBC 3.69 (L) 4.00 - 5.20 x10*6/uL    Hemoglobin 10.4 (L) 12.0 - 16.0 g/dL    Hematocrit 32.8 (L) 36.0 - 46.0 %    MCV  89 80 - 100 fL    MCH 28.2 26.0 - 34.0 pg    MCHC 31.7 (L) 32.0 - 36.0 g/dL    RDW 15.9 (H) 11.5 - 14.5 %    Platelets 283 150 - 450 x10*3/uL    MPV 9.4 7.5 - 11.5 fL    Neutrophils % 69.4 40.0 - 80.0 %    Immature Granulocytes %, Automated 0.5 0.0 - 0.9 %    Lymphocytes % 19.1 13.0 - 44.0 %    Monocytes % 8.0 2.0 - 10.0 %    Eosinophils % 2.5 0.0 - 6.0 %    Basophils % 0.5 0.0 - 2.0 %    Neutrophils Absolute 6.48 (H) 1.60 - 5.50 x10*3/uL    Immature Granulocytes Absolute, Automated 0.05 0.00 - 0.50 x10*3/uL    Lymphocytes Absolute 1.79 0.80 - 3.00 x10*3/uL    Monocytes Absolute 0.75 0.05 - 0.80 x10*3/uL    Eosinophils Absolute 0.23 0.00 - 0.40 x10*3/uL    Basophils Absolute 0.05 0.00 - 0.10 x10*3/uL   Comprehensive metabolic panel   Result Value Ref Range    Glucose 93 74 - 99 mg/dL    Sodium 135 (L) 136 - 145 mmol/L    Potassium 3.7 3.5 - 5.3 mmol/L    Chloride 98 98 - 107 mmol/L    Bicarbonate 29 21 - 32 mmol/L    Anion Gap 12 10 - 20 mmol/L    Urea Nitrogen 25 (H) 6 - 23 mg/dL    Creatinine 0.82 0.50 - 1.05 mg/dL    eGFR 66 >60 mL/min/1.73m*2    Calcium 8.7 8.6 - 10.3 mg/dL    Albumin 2.9 (L) 3.4 - 5.0 g/dL    Alkaline Phosphatase 158 (H) 33 - 136 U/L    Total Protein 6.5 6.4 - 8.2 g/dL    AST 30 9 - 39 U/L    Bilirubin, Total 0.7 0.0 - 1.2 mg/dL    ALT 24 7 - 45 U/L   Troponin I, High Sensitivity   Result Value Ref Range    Troponin I, High Sensitivity 24 (H) 0 - 13 ng/L   Protime-INR   Result Value Ref Range    Protime 12.9 (H) 9.8 - 12.8 seconds    INR 1.1 0.9 - 1.1   APTT   Result Value Ref Range    aPTT 32 27 - 38 seconds      CT angio brain attack head w IV contrast and post procedure    Result Date: 10/14/2023  Interpreted By:  Charly Grier, STUDY: CT ANGIO BRAIN ATTACK NECK W IV CONTRAST AND POST PROCEDURE; CT ANGIO BRAIN ATTACK HEAD W IV CONTRAST AND POST PROCEDURE; ;  10/14/2023 9:23 am   INDICATION: Signs/Symptoms:Stroke Evaluation with VAN Positive.   COMPARISON: None.   ACCESSION NUMBER(S):  [id number]; [id number]   ORDERING CLINICIAN: GIL FLORES   TECHNIQUE: CTA of the head and neck was performed after administration of 50 mL of Omnipaque 350 intravenously. Segmented MIPs are provided.   Of note, repeat CTA of the head and neck was performed due to patient motion but the repeat images demonstrate diminished opacification of the intracranial arteries and therefore essentially nondiagnostic.   FINDINGS: Evaluation is degraded due to patient motion.   CTA head:   Evaluation is heavily degraded due to patient motion, noting this, atherosclerotic calcifications are seen within the cavernous and paraophthalmic segments of the bilateral internal carotid arteries and within the precavernous segment of the left internal carotid artery. There is at most mild-to-moderate luminal narrowing involving the clinoid segment of the right ICA. Otherwise, there is no striking narrowing of the visualized internal carotid arteries.   A1 segment of the right anterior cerebral artery is smaller in luminal caliber compared to the left, likely developmental variant. Otherwise, there is no striking narrowing of the visualized portions of the bilateral anterior cerebral arteries. The M1 segments of the bilateral middle cerebral arteries are well visualized and there is mild luminal irregularity and at most slight luminal narrowing involving the M1 segment of the right MCA. Otherwise, there is no striking narrowing of the visualized portions of the bilateral middle cerebral arteries or evidence of abrupt cutoff of the middle cerebral arteries.   The visualized portions of the bilateral intradural vertebral arteries and basilar artery demonstrate no luminal narrowing. The right vertebral artery is dominant. There is no narrowing of the visualized portion of the right posterior cerebral artery. There is lack of contrast opacification seen within majority of the P1 segment of the left posterior cerebral artery. Some contrast  related opacification is seen within the P2 segment with mild luminal narrowing. There is again loss of contrast opacification within the left PCA located in the ambient cistern with some contrast related enhancement seen within the PCA located within the quadrigeminal cistern and distally.   There are no aneurysms.   CTA neck:   The aortic arch contains atherosclerotic calcifications without luminal narrowing. Atherosclerotic calcifications within the origins of the great arteries arising from the aortic arch without luminal narrowing. Bilateral common carotid arteries are tortuous and have a retropharyngeal course. There are coarse atherosclerotic calcifications located within the bilateral carotid bulbs and proximal cervical internal carotid arteries and there is likely greater than 80% narrowing involving the right carotid bulb and probably greater than 70% narrowing involving the left carotid bulb, noting that luminal narrowing may be exaggerated due to presence of coarse atherosclerotic calcifications. Otherwise, there is no striking narrowing of the remaining visualized portions of the bilateral cervical internal carotid arteries.   Striking narrowing of the visualized portions of bilateral vertebral arteries. The vertebral arteries are tortuous and the right vertebral artery is dominant.   Additional findings:   There are osseous degenerative changes of the cervical spine.   There is a partially visualized left pleural effusion.   Redemonstration of a patchy opacity located within the upper lobe of the left lung, also seen on the prior CT chest from 10/11/2022 which may be inflammatory or neoplastic in etiology. There is a partially visualized left chest pacemaker/AICD device with lead eventually extending into the superior vena cava.       Evaluation is degraded due to patient motion.   1. No evidence of acute occlusion of the visualized anterior intracranial arteries.   2. Markedly diminished contrast  opacification throughout the left posterior cerebral artery may be chronic in etiology, correlate clinically.   3. Atherosclerotic calcifications involving the intracranial arteries as detailed above   CTA neck:   1. Coarse atherosclerotic calcifications involving the bilateral carotid bulbs causing likely marked luminal narrowing. If clinically warranted, consider obtaining MRA of the neck which may better evaluate true luminal narrowing.   2. Otherwise, no striking narrowing of the visualized arteries in the neck.   3. Redemonstration of nonspecific opacity within the upper lobe of the left lung which given stability since 2022 could reflect a neoplastic or inflammatory process. Consider dedicated CT of the chest for further evaluation.   This study was interpreted at [facility].     MACRO: None   Signed by: Charly Grier 10/14/2023 9:47 AM Dictation workstation:   [id number]    CT angio brain attack neck w IV contrast and post procedure    Result Date: 10/14/2023  Interpreted By:  Charly Grier, STUDY: CT ANGIO BRAIN ATTACK NECK W IV CONTRAST AND POST PROCEDURE; CT ANGIO BRAIN ATTACK HEAD W IV CONTRAST AND POST PROCEDURE; ;  10/14/2023 9:23 am   INDICATION: Signs/Symptoms:Stroke Evaluation with VAN Positive.   COMPARISON: None.   ACCESSION NUMBER(S): [id number]; [id number]   ORDERING CLINICIAN: GIL FLORES   TECHNIQUE: CTA of the head and neck was performed after administration of 50 mL of Omnipaque 350 intravenously. Segmented MIPs are provided.   Of note, repeat CTA of the head and neck was performed due to patient motion but the repeat images demonstrate diminished opacification of the intracranial arteries and therefore essentially nondiagnostic.   FINDINGS: Evaluation is degraded due to patient motion.   CTA head:   Evaluation is heavily degraded due to patient motion, noting this, atherosclerotic calcifications are seen within the cavernous and paraophthalmic  segments of the bilateral internal carotid arteries and within the precavernous segment of the left internal carotid artery. There is at most mild-to-moderate luminal narrowing involving the clinoid segment of the right ICA. Otherwise, there is no striking narrowing of the visualized internal carotid arteries.   A1 segment of the right anterior cerebral artery is smaller in luminal caliber compared to the left, likely developmental variant. Otherwise, there is no striking narrowing of the visualized portions of the bilateral anterior cerebral arteries. The M1 segments of the bilateral middle cerebral arteries are well visualized and there is mild luminal irregularity and at most slight luminal narrowing involving the M1 segment of the right MCA. Otherwise, there is no striking narrowing of the visualized portions of the bilateral middle cerebral arteries or evidence of abrupt cutoff of the middle cerebral arteries.   The visualized portions of the bilateral intradural vertebral arteries and basilar artery demonstrate no luminal narrowing. The right vertebral artery is dominant. There is no narrowing of the visualized portion of the right posterior cerebral artery. There is lack of contrast opacification seen within majority of the P1 segment of the left posterior cerebral artery. Some contrast related opacification is seen within the P2 segment with mild luminal narrowing. There is again loss of contrast opacification within the left PCA located in the ambient cistern with some contrast related enhancement seen within the PCA located within the quadrigeminal cistern and distally.   There are no aneurysms.   CTA neck:   The aortic arch contains atherosclerotic calcifications without luminal narrowing. Atherosclerotic calcifications within the origins of the great arteries arising from the aortic arch without luminal narrowing. Bilateral common carotid arteries are tortuous and have a retropharyngeal course. There are  coarse atherosclerotic calcifications located within the bilateral carotid bulbs and proximal cervical internal carotid arteries and there is likely greater than 80% narrowing involving the right carotid bulb and probably greater than 70% narrowing involving the left carotid bulb, noting that luminal narrowing may be exaggerated due to presence of coarse atherosclerotic calcifications. Otherwise, there is no striking narrowing of the remaining visualized portions of the bilateral cervical internal carotid arteries.   Striking narrowing of the visualized portions of bilateral vertebral arteries. The vertebral arteries are tortuous and the right vertebral artery is dominant.   Additional findings:   There are osseous degenerative changes of the cervical spine.   There is a partially visualized left pleural effusion.   Redemonstration of a patchy opacity located within the upper lobe of the left lung, also seen on the prior CT chest from 10/11/2022 which may be inflammatory or neoplastic in etiology. There is a partially visualized left chest pacemaker/AICD device with lead eventually extending into the superior vena cava.       Evaluation is degraded due to patient motion.   1. No evidence of acute occlusion of the visualized anterior intracranial arteries.   2. Markedly diminished contrast opacification throughout the left posterior cerebral artery may be chronic in etiology, correlate clinically.   3. Atherosclerotic calcifications involving the intracranial arteries as detailed above   CTA neck:   1. Coarse atherosclerotic calcifications involving the bilateral carotid bulbs causing likely marked luminal narrowing. If clinically warranted, consider obtaining MRA of the neck which may better evaluate true luminal narrowing.   2. Otherwise, no striking narrowing of the visualized arteries in the neck.   3. Redemonstration of nonspecific opacity within the upper lobe of the left lung which given stability since 2022  could reflect a neoplastic or inflammatory process. Consider dedicated CT of the chest for further evaluation.   This study was interpreted at [facility].     MACRO: None   Signed by: Charly Grier 10/14/2023 9:47 AM Dictation workstation:   [id number]    CT brain attack head wo IV contrast    Result Date: 10/14/2023  Interpreted By:  Di Urban, STUDY: CT BRAIN ATTACK HEAD WO IV CONTRAST;  10/14/2023 9:10 am   INDICATION: Signs/Symptoms:Stroke Evaluation.   COMPARISON: 02/26/2023   ACCESSION NUMBER(S): [id number]   ORDERING CLINICIAN: GIL FLORES   TECHNIQUE: Unenhanced CT images of the head were obtained.   FINDINGS: The ventricles, cisterns and sulci are enlarged, consistent with diffuse volume loss. There are areas of nonspecific white matter hypodensity, which are probably age related or microvascular in nature. These findings are similar to the previous exam. There is no acute intracranial hemorrhage, mass effect or midline shift. No extraaxial fluid collection.   No focal calvarial lesion.   Visualized paranasal sinuses are clear.       No acute intracranial hemorrhage or mass-effect.   MACRO: Di Urban discussed the significance and urgency of this critical finding by telephone with  GIL FLORES on 10/14/2023 at 9:13 am. (**-RCF-**) Findings:  See findings.     Signed by: Di Urban 10/14/2023 9:15 AM Dictation workstation:   [id number]        Assessment/Plan   Principal Problem:  Right hemiplegia (CMS/HCC)  -patients presentation is extremely concerning for acute CVA  -She has a hx of pAF, but is not on OAC   -Imaging without large vessel occlusion or hemorrhage   -MRI brain pending  -Echo pending   -Tele   -Lipids and A1C pending   -Continue home Plavix and HI statin   -Consult neurology     Paroxysmal atrial fibrillation (CMS/HCC)  -she's not on any rate control or stroke ppx     Coronary artery disease involving native coronary artery of native heart  without angina pectoris  -home Plavix     Type 2 diabetes mellitus without complication, without long-term current use of insulin (CMS/[facility])  -diet controlled    HFrEF (heart failure with reduced ejection fraction) (CMS/[facility])  -hold home lasix while allowing for permissive HTN for 24-48h    Centrilobular emphysema (CMS/[facility])  -Continue home meds  -No baseline O2 req    Acquired hypothyroidism  -Continue home meds    Major depressive disorder (CMS/[facility])  -Continue home meds    Gastroesophageal reflux disease without esophagitis  -Continue home meds    Overactive bladder  -Continue home meds       Radhames Ahuja MD

## 2023-10-15 NOTE — NURSING NOTE
Unable to complete the whole neuro assessment. Pt appears to be restless and anxious while awake. Pt does not follow any commands. Pt pulls off O2 NC line, clothes, and telemetry leads while awake. Pt resists with care during ADLs.  Active ROM noted LUE and LLE. Active movements noted on Lt face as well. Paralysis noted with Rt face, RUE, and RLE. Continue monitoring.    Surgeon/Pathologist Verbiage (Will Incorporate Name Of Surgeon From Intro If Not Blank): operated in two distinct and integrated capacities as the surgeon and pathologist.

## 2023-10-15 NOTE — CONSULTS
"History Of Present Illness  Lauren Reyna is a 89 y.o. female presenting with aphasia and right hemiplegia.    Chart reviewed. Son and two daughters are in room.    PMH of hypertension, atrial fibrillation, symptomatic bradycardia s/p pacer 07/18/2015, hyperlipidemia, hypothyroidism, overactive bladder, COPD (not on chronic supplemental oxygen), Coronary Artery Disease, non insulin dependent  type 2 diabetes mellitus, iron deficiency anemia and GERD presenting with R sided weakness.     Patient resides at [place] [facility]. Per her daughter she had been acting a little strange and confused two days prior to event.. Normally she is able to converse and is AOx3. She is wheelchair bound and requires assistance for transfers. Yesterday morning she was found to be somnolent and with what appeared to be R sided paralysis and severe nonfluent aphasia. She was brought to the ED. Her NIH score was 25 in the ED. She was out of the window for thrombolytics.    Today she is awake,but agitated. She tells me not to touch her. Can say a few words \"Mom\" \"Don't touch me\" but remains with severe non-fluent aphasia.  She has complete paralysis of the right arm, and to lesser extent of right leg.    Past Medical History  History reviewed. No pertinent past medical history.  ypertension, atrial fibrillation, symptomatic bradycardia s/p pacer 07/18/2015, hyperlipidemia, hypothyroidism, overactive bladder, COPD (not on chronic supplemental oxygen), Coronary Artery Disease, non insulin dependent  type 2 diabetes mellitus, iron deficiency anemia and GERD.   Surgical History  History reviewed. No pertinent surgical history.    Social History  Social History     Substance and Sexual Activity   Drug Use Not on file     Tobacco Use: Not on file     Alcohol Use: Unknown (10/14/2023)    AUDIT-C     Frequency of Alcohol Consumption: Patient refused     Average Number of Drinks: Patient refused     Frequency of Binge Drinking: Patient refused       Family " "History  No relevant family history has been documented for this patient.     Allergies  Lisinopril    Review of Systems  Review of Systems:     Constitution (wt. loss, etc.): No recent weight loss  Eyes: No trouble with vision; no loss of vision   Ears, Nose, mouth, throat: no trouble with hearing or speech  Card/vasc: No chest pain  Resp: No SOB  GI: No bowel troubles   : No bladder troubles   Musculoskeletal: Wheelchair bound. Integumentary (skin, breast): No skin rash   Neuro: Right hemiplegia.    Psych: No stress in life  Hem/lymph: No bleeding abnormalities or bruising.  All/Imm: No immune disorders. No nasal stuffiness.  All others negative      Physical Exam/Neuro Exam:  GENERAL APPEARANCE: Frail.  SENSORIUM: Alert.  SPEECH is severe non-fluent aphasia. SKIN is no rash  CARDIAC:  RRR,bradycardic.  LUNGS: CTA  ABDOMEN: Soft  Extremities: No clubbing or edema.   ENT: Neck supple.         NEUROLOGICAL EXAM:     CRANIAL NERVES  I not tested.  II visual fields can't test.II III Pupils equal.   III IV VI EOMI  V strength 5/5 bilaterally for temporal, masseter, and pterygoid muscle.  V VII corneal reflex not tested.   VII face symmetrical  VIII hearing intact  IX X palate symmetrical  XII trapezius and SCM symmetrical  XII tongue midline.    GAIT: Not tested. Paralysis.  MOTOR:   Bulk : Atrophy.  Muscles: Right hemiplegia, 0/5 and flaccid right arm. Right leg 2/5.  COORDINATION: Can't assess secondary to paralysis and aphasia.    SENSORY: Can't assess secondary to paralysis and aphasia.      Last Recorded Vitals  Blood pressure 127/76, pulse 75, temperature 36.2 °C (97.2 °F), temperature source Temporal, resp. rate 14, height 1.473 m (4' 10\"), weight 48 kg (105 lb 13.1 oz), SpO2 99 %.    Relevant Results      Scheduled medications  atorvastatin, 80 mg, oral, Nightly  clopidogrel, 75 mg, oral, Daily  perflutren lipid microspheres, 0.5-10 mL of dilution, intravenous, Once in imaging      Continuous " medications  lactated Ringer's, 75 mL/hr, Last Rate: 75 mL/hr (10/15/23 1445)      PRN medications  PRN medications: hydrALAZINE **FOLLOWED BY** [START ON 10/16/2023] hydrALAZINE, labetaloL, polyethylene glycol      {  Results for orders placed or performed during the hospital encounter of 10/14/23 (from the past 96 hour(s))   POCT glucose   Result Value Ref Range    POCT Glucose 129 (A) 74 - 99 mg/dL   CBC and Auto Differential   Result Value Ref Range    WBC 9.4 4.4 - 11.3 x10*3/uL    nRBC 0.0 0.0 - 0.0 /100 WBCs    RBC 3.69 (L) 4.00 - 5.20 x10*6/uL    Hemoglobin 10.4 (L) 12.0 - 16.0 g/dL    Hematocrit 32.8 (L) 36.0 - 46.0 %    MCV 89 80 - 100 fL    MCH 28.2 26.0 - 34.0 pg    MCHC 31.7 (L) 32.0 - 36.0 g/dL    RDW 15.9 (H) 11.5 - 14.5 %    Platelets 283 150 - 450 x10*3/uL    MPV 9.4 7.5 - 11.5 fL    Neutrophils % 69.4 40.0 - 80.0 %    Immature Granulocytes %, Automated 0.5 0.0 - 0.9 %    Lymphocytes % 19.1 13.0 - 44.0 %    Monocytes % 8.0 2.0 - 10.0 %    Eosinophils % 2.5 0.0 - 6.0 %    Basophils % 0.5 0.0 - 2.0 %    Neutrophils Absolute 6.48 (H) 1.60 - 5.50 x10*3/uL    Immature Granulocytes Absolute, Automated 0.05 0.00 - 0.50 x10*3/uL    Lymphocytes Absolute 1.79 0.80 - 3.00 x10*3/uL    Monocytes Absolute 0.75 0.05 - 0.80 x10*3/uL    Eosinophils Absolute 0.23 0.00 - 0.40 x10*3/uL    Basophils Absolute 0.05 0.00 - 0.10 x10*3/uL   Comprehensive metabolic panel   Result Value Ref Range    Glucose 93 74 - 99 mg/dL    Sodium 135 (L) 136 - 145 mmol/L    Potassium 3.7 3.5 - 5.3 mmol/L    Chloride 98 98 - 107 mmol/L    Bicarbonate 29 21 - 32 mmol/L    Anion Gap 12 10 - 20 mmol/L    Urea Nitrogen 25 (H) 6 - 23 mg/dL    Creatinine 0.82 0.50 - 1.05 mg/dL    eGFR 66 >60 mL/min/1.73m*2    Calcium 8.7 8.6 - 10.3 mg/dL    Albumin 2.9 (L) 3.4 - 5.0 g/dL    Alkaline Phosphatase 158 (H) 33 - 136 U/L    Total Protein 6.5 6.4 - 8.2 g/dL    AST 30 9 - 39 U/L    Bilirubin, Total 0.7 0.0 - 1.2 mg/dL    ALT 24 7 - 45 U/L   Troponin I,  High Sensitivity   Result Value Ref Range    Troponin I, High Sensitivity 24 (H) 0 - 13 ng/L   Protime-INR   Result Value Ref Range    Protime 12.9 (H) 9.8 - 12.8 seconds    INR 1.1 0.9 - 1.1   APTT   Result Value Ref Range    aPTT 32 27 - 38 seconds   Lipid Panel   Result Value Ref Range    Cholesterol 132 0 - 199 mg/dL    HDL-Cholesterol 40.2 mg/dL    Cholesterol/HDL Ratio 3.3     LDL Calculated 77 <=99 mg/dL    VLDL 14 0 - 40 mg/dL    Triglycerides 72 0 - 149 mg/dL    Non HDL Cholesterol 92 0 - 149 mg/dL   POCT GLUCOSE   Result Value Ref Range    POCT Glucose 98 74 - 99 mg/dL        CT angio brain attack head w IV contrast and post procedure    Result Date: 10/14/2023  Interpreted By:  Charly Grier, STUDY: CT ANGIO BRAIN ATTACK NECK W IV CONTRAST AND POST PROCEDURE; CT ANGIO BRAIN ATTACK HEAD W IV CONTRAST AND POST PROCEDURE; ;  10/14/2023 9:23 am   INDICATION: Signs/Symptoms:Stroke Evaluation with VAN Positive.   COMPARISON: None.   ACCESSION NUMBER(S): [id number]; [id number]   ORDERING CLINICIAN: GIL FLORES   TECHNIQUE: CTA of the head and neck was performed after administration of 50 mL of Omnipaque 350 intravenously. Segmented MIPs are provided.   Of note, repeat CTA of the head and neck was performed due to patient motion but the repeat images demonstrate diminished opacification of the intracranial arteries and therefore essentially nondiagnostic.   FINDINGS: Evaluation is degraded due to patient motion.   CTA head:   Evaluation is heavily degraded due to patient motion, noting this, atherosclerotic calcifications are seen within the cavernous and paraophthalmic segments of the bilateral internal carotid arteries and within the precavernous segment of the left internal carotid artery. There is at most mild-to-moderate luminal narrowing involving the clinoid segment of the right ICA. Otherwise, there is no striking narrowing of the visualized internal carotid arteries.   A1 segment of the right  anterior cerebral artery is smaller in luminal caliber compared to the left, likely developmental variant. Otherwise, there is no striking narrowing of the visualized portions of the bilateral anterior cerebral arteries. The M1 segments of the bilateral middle cerebral arteries are well visualized and there is mild luminal irregularity and at most slight luminal narrowing involving the M1 segment of the right MCA. Otherwise, there is no striking narrowing of the visualized portions of the bilateral middle cerebral arteries or evidence of abrupt cutoff of the middle cerebral arteries.   The visualized portions of the bilateral intradural vertebral arteries and basilar artery demonstrate no luminal narrowing. The right vertebral artery is dominant. There is no narrowing of the visualized portion of the right posterior cerebral artery. There is lack of contrast opacification seen within majority of the P1 segment of the left posterior cerebral artery. Some contrast related opacification is seen within the P2 segment with mild luminal narrowing. There is again loss of contrast opacification within the left PCA located in the ambient cistern with some contrast related enhancement seen within the PCA located within the quadrigeminal cistern and distally.   There are no aneurysms.   CTA neck:   The aortic arch contains atherosclerotic calcifications without luminal narrowing. Atherosclerotic calcifications within the origins of the great arteries arising from the aortic arch without luminal narrowing. Bilateral common carotid arteries are tortuous and have a retropharyngeal course. There are coarse atherosclerotic calcifications located within the bilateral carotid bulbs and proximal cervical internal carotid arteries and there is likely greater than 80% narrowing involving the right carotid bulb and probably greater than 70% narrowing involving the left carotid bulb, noting that luminal narrowing may be exaggerated due to  presence of coarse atherosclerotic calcifications. Otherwise, there is no striking narrowing of the remaining visualized portions of the bilateral cervical internal carotid arteries.   Striking narrowing of the visualized portions of bilateral vertebral arteries. The vertebral arteries are tortuous and the right vertebral artery is dominant.   Additional findings:   There are osseous degenerative changes of the cervical spine.   There is a partially visualized left pleural effusion.   Redemonstration of a patchy opacity located within the upper lobe of the left lung, also seen on the prior CT chest from 10/11/2022 which may be inflammatory or neoplastic in etiology. There is a partially visualized left chest pacemaker/AICD device with lead eventually extending into the superior vena cava.       Evaluation is degraded due to patient motion.   1. No evidence of acute occlusion of the visualized anterior intracranial arteries.   2. Markedly diminished contrast opacification throughout the left posterior cerebral artery may be chronic in etiology, correlate clinically.   3. Atherosclerotic calcifications involving the intracranial arteries as detailed above   CTA neck:   1. Coarse atherosclerotic calcifications involving the bilateral carotid bulbs causing likely marked luminal narrowing. If clinically warranted, consider obtaining MRA of the neck which may better evaluate true luminal narrowing.   2. Otherwise, no striking narrowing of the visualized arteries in the neck.   3. Redemonstration of nonspecific opacity within the upper lobe of the left lung which given stability since 2022 could reflect a neoplastic or inflammatory process. Consider dedicated CT of the chest for further evaluation.   This study was interpreted at [facility].     MACRO: None   Signed by: Charly Grier 10/14/2023 9:47 AM Dictation workstation:   [id number]    CT angio brain attack neck w IV contrast and  post procedure    Result Date: 10/14/2023  Interpreted By:  Charly Grier, STUDY: CT ANGIO BRAIN ATTACK NECK W IV CONTRAST AND POST PROCEDURE; CT ANGIO BRAIN ATTACK HEAD W IV CONTRAST AND POST PROCEDURE; ;  10/14/2023 9:23 am   INDICATION: Signs/Symptoms:Stroke Evaluation with VAN Positive.   COMPARISON: None.   ACCESSION NUMBER(S): [id number]; [id number]   ORDERING CLINICIAN: GIL FLORES   TECHNIQUE: CTA of the head and neck was performed after administration of 50 mL of Omnipaque 350 intravenously. Segmented MIPs are provided.   Of note, repeat CTA of the head and neck was performed due to patient motion but the repeat images demonstrate diminished opacification of the intracranial arteries and therefore essentially nondiagnostic.   FINDINGS: Evaluation is degraded due to patient motion.   CTA head:   Evaluation is heavily degraded due to patient motion, noting this, atherosclerotic calcifications are seen within the cavernous and paraophthalmic segments of the bilateral internal carotid arteries and within the precavernous segment of the left internal carotid artery. There is at most mild-to-moderate luminal narrowing involving the clinoid segment of the right ICA. Otherwise, there is no striking narrowing of the visualized internal carotid arteries.   A1 segment of the right anterior cerebral artery is smaller in luminal caliber compared to the left, likely developmental variant. Otherwise, there is no striking narrowing of the visualized portions of the bilateral anterior cerebral arteries. The M1 segments of the bilateral middle cerebral arteries are well visualized and there is mild luminal irregularity and at most slight luminal narrowing involving the M1 segment of the right MCA. Otherwise, there is no striking narrowing of the visualized portions of the bilateral middle cerebral arteries or evidence of abrupt cutoff of the middle cerebral arteries.   The visualized portions of the bilateral  intradural vertebral arteries and basilar artery demonstrate no luminal narrowing. The right vertebral artery is dominant. There is no narrowing of the visualized portion of the right posterior cerebral artery. There is lack of contrast opacification seen within majority of the P1 segment of the left posterior cerebral artery. Some contrast related opacification is seen within the P2 segment with mild luminal narrowing. There is again loss of contrast opacification within the left PCA located in the ambient cistern with some contrast related enhancement seen within the PCA located within the quadrigeminal cistern and distally.   There are no aneurysms.   CTA neck:   The aortic arch contains atherosclerotic calcifications without luminal narrowing. Atherosclerotic calcifications within the origins of the great arteries arising from the aortic arch without luminal narrowing. Bilateral common carotid arteries are tortuous and have a retropharyngeal course. There are coarse atherosclerotic calcifications located within the bilateral carotid bulbs and proximal cervical internal carotid arteries and there is likely greater than 80% narrowing involving the right carotid bulb and probably greater than 70% narrowing involving the left carotid bulb, noting that luminal narrowing may be exaggerated due to presence of coarse atherosclerotic calcifications. Otherwise, there is no striking narrowing of the remaining visualized portions of the bilateral cervical internal carotid arteries.   Striking narrowing of the visualized portions of bilateral vertebral arteries. The vertebral arteries are tortuous and the right vertebral artery is dominant.   Additional findings:   There are osseous degenerative changes of the cervical spine.   There is a partially visualized left pleural effusion.   Redemonstration of a patchy opacity located within the upper lobe of the left lung, also seen on the prior CT chest from 10/11/2022 which may  be inflammatory or neoplastic in etiology. There is a partially visualized left chest pacemaker/AICD device with lead eventually extending into the superior vena cava.       Evaluation is degraded due to patient motion.   1. No evidence of acute occlusion of the visualized anterior intracranial arteries.   2. Markedly diminished contrast opacification throughout the left posterior cerebral artery may be chronic in etiology, correlate clinically.   3. Atherosclerotic calcifications involving the intracranial arteries as detailed above   CTA neck:   1. Coarse atherosclerotic calcifications involving the bilateral carotid bulbs causing likely marked luminal narrowing. If clinically warranted, consider obtaining MRA of the neck which may better evaluate true luminal narrowing.   2. Otherwise, no striking narrowing of the visualized arteries in the neck.   3. Redemonstration of nonspecific opacity within the upper lobe of the left lung which given stability since 2022 could reflect a neoplastic or inflammatory process. Consider dedicated CT of the chest for further evaluation.   This study was interpreted at [facility].     MACRO: None   Signed by: Charly Grier 10/14/2023 9:47 AM Dictation workstation:   [id number]    CT brain attack head wo IV contrast    Result Date: 10/14/2023  Interpreted By:  Di Urban, STUDY: CT BRAIN ATTACK HEAD WO IV CONTRAST;  10/14/2023 9:10 am   INDICATION: Signs/Symptoms:Stroke Evaluation.   COMPARISON: 02/26/2023   ACCESSION NUMBER(S): [id number]   ORDERING CLINICIAN: GIL FLORES   TECHNIQUE: Unenhanced CT images of the head were obtained.   FINDINGS: The ventricles, cisterns and sulci are enlarged, consistent with diffuse volume loss. There are areas of nonspecific white matter hypodensity, which are probably age related or microvascular in nature. These findings are similar to the previous exam. There is no acute intracranial hemorrhage, mass  effect or midline shift. No extraaxial fluid collection.   No focal calvarial lesion.   Visualized paranasal sinuses are clear.       No acute intracranial hemorrhage or mass-effect.   MACRO: Di Urban discussed the significance and urgency of this critical finding by telephone with  GIL FLORES on 10/14/2023 at 9:13 am. (**-RCF-**) Findings:  See findings.     Signed by: Di Urban 10/14/2023 9:15 AM Dictation workstation:   [id number]                NIHSS I performed is 16 (see link to scale.)       Assessment/Plan         IMP:1. Suspect significant left MCA infarct with right hemiplegia and aphasia. Ct shows atrophy and , but likely too early. CTA motion degraded,but 80% stenosis JAYLYN and 70% LICA.    REC:  Pacer placed 8 years ago and may not be MRI compatible. If unable to get MRI tomorrow would repeat Ct Scan to assess evolution of stroke.  Already on plavix and statin, would add baby ASA.    Dr. Brewer and Humaira Moe assume service tomorrow.I will inform them of patient.     Discussed plan with family.        I spent 75 minutes in the professional and overall care of this patient.      Joan Dejesus, DO

## 2023-10-15 NOTE — PROGRESS NOTES
Lauren Reyna is a 89 y.o. female on day 1 of admission presenting with Right hemiplegia (CMS/HCC).    Subjective   Lauren Reyna is a 89 y.o. 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     Patient resides at [place]. Per her son she was in her usual state of health until yesterday morning when she began acting confused. Normally she is able to converse and is AOx3. She is wheelchair bound and requires assistance for transfers. This morning she was found to be somnolent and with what appeared to be R sided paralysis. She was brought to the ED. Her NIH score was 25 in the ED. She was out of the window for thrombolytics. On my interview she is extremely somnolent and only opens eyes to voice then immediately goes back to sleep.      I did confirm with patients son Félix patient is DNR/DNI.     10/15: Patient is more awake and alert today however she is still unable to follow commands or engage in meaningful speech.           Objective   Gen: Not in acute distress  Head/Neck: NCAT  Eyes: Anicteric sclerae, noninjected conjunctivae  Nose: No rhinorrhea  Mouth:  MMM  Heart: Regular rate and rhythm w/ no murmurs, rubs, gallops  Lungs: CTAB w/o wheezes, rales, rhonchi, no increased work of breathing  Abdomen: Soft, NT/ND  Musculoskeletal: No deformities  Extremities: No edema.  Neurologic: Please see below for NIHSS  Skin: No rashes noted  Psychological: Calm        1a         Level of consciousness:           1=not alert but arousable by minor stimulation to obey, answer or respond  1b.        LOC questions:            2=Performs neither task correctly  1c.        LOC commands:          2=Performs neither task correctly  2.          " Best Gaze:        1=partial gaze palsy  3.          Visual:  1=Partial hemianopia  4.          Facial Palsy:      1=Minor paralysis (flattened nasolabial fold, asymmetric on smiling)  5a.        Motor left arm:            1=Drift, limb holds 90 (or 45) degrees but drifts down before full 10 seconds: does not hit bed  5b.        Motor right arm:          4=No movement  6a.        motor left le=Some effort against gravity, limb cannot get to or maintain (if cured) 90 (or 45) degrees, drifts down to bed, but has some effort against gravity  6b        Motor right leg:           3=No effort against gravity, limb falls  7.          Limb Ataxia:     0  8.          Sensory:           2=Severe to total sensory loss; patient is not aware of being touched in face, arm, leg  9.          Best Language:            3=Mute, global aphasia; no usable speech or auditory comprehension  10.        Dysarthria:       2=Severe; patient speech is so slurred as to be unintelligible in the absence of or our of proportion to any dysphagia, or is mute/anarthric  11.        Extinction and Inattention:      0=No abnormality  12.        Distal motor function: 0=Normal               Total:     25    Last Recorded Vitals  Blood pressure 152/70, pulse 60, temperature 36.6 °C (97.9 °F), temperature source Temporal, resp. rate 17, height 1.6 m (5' 3\"), weight 63.4 kg (139 lb 12.4 oz), SpO2 (!) 89 %.  Intake/Output last 3 Shifts:  I/O last 3 completed shifts:  In: 0 (0 mL/kg)   Out: 400 (6.3 mL/kg) [Urine:400 (0.2 mL/kg/hr)]  Weight: 63.4 kg     Relevant Results           This patient currently has cardiac telemetry ordered; if you would like to modify or discontinue the telemetry order, click here to go to the orders activity to modify/discontinue the order.                 Assessment/Plan     Right hemiplegia (CMS/HCC)  -patients presentation is extremely concerning for acute CVA  -She has a hx of pAF, but is not on OAC   -Imaging without large " vessel occlusion or hemorrhage   -Unable to obtain MRI brain at this time as we are unsure if patient PPM is compatible (I believe it is likely not compatible, the model is a [employer], placed in 2015).   -Echo pending   -Tele   -LDL 77  -Continue home Plavix and HI statin   -Consult neurology      Paroxysmal atrial fibrillation (CMS/[facility])  -She's not on stroke ppx. S/P PPM.      Coronary artery disease involving native coronary artery of native heart without angina pectoris  -home Plavix      Type 2 diabetes mellitus without complication, without long-term current use of insulin (CMS/[facility])  -diet controlled     HFrEF (heart failure with reduced ejection fraction) (CMS/[facility])  -hold home lasix while allowing for permissive HTN for 24-48h     Centrilobular emphysema (CMS/[facility])  -Continue home meds  -No baseline O2 req     Acquired hypothyroidism  -Continue home meds     Major depressive disorder (CMS/[facility])  -Continue home meds     Gastroesophageal reflux disease without esophagitis  -Continue home meds     Overactive bladder  -Continue home meds      Radhames Ahuja MD

## 2023-10-15 NOTE — NURSING NOTE
Pt was able to say a couple spontaneous words but does respond to questions asked. Pt is restless at times, pulls at lines and takes off covers. Right side remains flaccid.

## 2023-10-15 NOTE — PROGRESS NOTES
Physical Therapy                 Therapy Communication Note    Patient Name: Lauren Reyna  MRN: [id number]  Today's Date: 10/15/2023     Discipline: Physical Therapy    Missed Visit Reason:  Per nursing staff, pt has been mostly unresponsive, unable to follow commands.     Missed Time: Attempt    Comment: pt is LTC resident [place], wheelchair bound at baseline. Will defer PT to Mon for co-eval with OT and pending availability of additional baseline info.

## 2023-10-16 PROBLEM — I63.332: Status: ACTIVE | Noted: 2023-01-01

## 2023-10-16 PROBLEM — I63.239 CAROTID STENOSIS, SYMPTOMATIC, WITH INFARCTION (MULTI): Status: ACTIVE | Noted: 2023-01-01

## 2023-10-16 NOTE — NURSING NOTE
Pt work of breathing increased - abdominal muscle use noted. Rhonchi and expiratory wheezes present upon auscultation. SpO2 96% on 3L N/C. DEBBIE DICK contacted, fluids to be discontinued and cxr ordered at this time per Wendi DICK. DEBBIE PA to come to bedside to personally assess pt. Will continue to assess and monitor.

## 2023-10-16 NOTE — PROGRESS NOTES
Occupational Therapy                 Therapy Communication Note    Patient Name: Lauren Reyna  MRN: [id number]  Today's Date: 10/16/2023     Discipline: Occupational Therapy    Missed Visit Reason:  Per nursing staff, pt has been mostly unresponsive, unable to follow commands.      Missed Time: Attempt     Comment: pt is LTC resident [place], wheelchair bound at baseline. Will defer OT to Tues for co-eval with PT a

## 2023-10-16 NOTE — CARE PLAN
Problem: Fall/Injury  Goal: Verbalize understanding of personal risk factors for fall in the hospital  Outcome: Not Progressing  Goal: Verbalize understanding of risk factor reduction measures to prevent injury from fall in the home  Outcome: Not Progressing  Goal: Use assistive devices by end of the shift  Outcome: Not Progressing  Goal: Pace activities to prevent fatigue by end of the shift  Outcome: Not Progressing    Problem: Fall/Injury  Goal: Verbalize understanding of personal risk factors for fall in the hospital  Outcome: Not Progressing  Goal: Verbalize understanding of risk factor reduction measures to prevent injury from fall in the home  Outcome: Not Progressing  Goal: Use assistive devices by end of the shift  Outcome: Not Progressing  Goal: Pace activities to prevent fatigue by end of the shift  Outcome: Not Progressing

## 2023-10-16 NOTE — PROGRESS NOTES
Speech-Language Pathology    Inpatient Speech-Language Pathology Clinical Swallow Evaluation    Patient Name: Lauren Reyna  MRN: [id number]  Today's Date: 10/16/2023   Time Calculation  Start Time: 1120  Stop Time: 1143  Time Calculation (min): 23 min         Current Problem:   Patient Active Problem List   Diagnosis    Centrilobular emphysema (CMS/HCC)    Acquired hypothyroidism    Mild episode of recurrent major depressive disorder (CMS/HCC)    Gastroesophageal reflux disease without esophagitis    Overactive bladder    Coronary artery disease involving native coronary artery of native heart without angina pectoris    Paroxysmal atrial fibrillation (CMS/HCC)    HFrEF (heart failure with reduced ejection fraction) (CMS/HCC)    Type 2 diabetes mellitus without complication, without long-term current use of insulin (CMS/HCC)    Right hemiplegia (CMS/HCC)    Cerebrovascular accident (CVA) due to thrombosis of left posterior cerebral artery (CMS/HCC)    Carotid stenosis, symptomatic, with infarction (CMS/HCC)         Recommendations:  Risk for Aspiration: Other (Comment) (High)  Additional Recommendations: NPO  Solid Diet Recommendations : NPO  Liquid Diet Recommendations: NPO  Medication Administration Recommendations: Non Oral  Duration of Treatment: 2 weeks  Follow up treatments: Thermal stimulation, Patient/family education, Other (Comment) (PO trials)      Assessment:  Assessment Results: Difficult to accurately assess due to pt resisting much of evaluation, however pt appears to be a high aspiration risk at this time due to poor awareness, inability to follow commands, oral weakness,  baseline dysphonia, and impaired secretion management.  Prognosis: Guarded  Barriers to Discharge: NPO  Medical Staff Made Aware: Yes      Plan:  Treatment/Interventions: Thermal stimulation, Patient/family education, Sensory stimulation, Diet recommendations  SLP Plan: Skilled SLP  SLP Frequency: 3x per week  Duration: 2  weeks      Subjective       General Visit Information:  Patient Class: Inpatient  Living Environment: Home  Caregiver Feedback: RN cleared pt to participate in session  Ordering Physician: Dr. Ahuja  Reason for Referral: Suspected oral or pharyngeal dysphagia; rule out aspiration  Past Medical History Relevant to Rehab: hypertension, atrial fibrillation, symptomatic bradycardia s/p pacer 2015, hyperlipidemia, hypothyroidism, overactive bladder, COPD (not on chronic supplemental oxygen), Coronary Artery Disease, non insulin dependent  type 2 diabetes mellitus, iron deficiency anemia and GERD presenting with R sided weakness.  Prior Level of Function: WFL  Patient Seen During This Visit: Yes  Prior to Session Communication: Bedside nurse  Date of Onset: 10/14/23  Dysphagia Diagnosis:  (Oropharyngeal dysphagia)      Objective       Baseline Assessment:  Respiratory Status: Oxygen via nasal cannula (Pt frequently removing NC and required redirection to replace NC)  Behavior/Cognition: Agitated, Uncooperative, Doesn't follow directions (Alert but with eyes closed for most of session)  Patient Positioning: Upright in Bed  Baseline Vocal Quality: Wet      Oral/Motor Assessment:  Oral Hygiene: Oral mucosa were dry, with min dried residuals vs plaque on teeth and tongue  Dentition: Poor Dental/Oral Hygiene, Dentures (Lower Full), Dentures (Upper Full) (SLP removed and cleaned upper denture; pt did not allow SLP to remove lower denture. SLP attempted oral care, however pt resistive to oral care)  Facial Symmetry: Right droop  Vocal Quality: Exceptions to WFL  Vocal Quality Impairment: Harsh, Hoarse, Wet  Intelligibility: Intelligibility reduced  Intelligibility Ratin%-50%  Breath Support: Inadequate for speech      Consistencies Trialed:  Consistencies Trialed: Yes  Consistencies Trialed: Ice Chips (x1)      Clinical Observations:  Patient Positioning: Upright in Bed  Management of Oral Secretions: Adequate  Latch  Oral Secretions: Inadequate  Was The 3 oz Swallow Protocol Completed: No  Clinical Observation Comment: Pt resistive oral care. Pt did not follow any commands. No spontaneous swallows were observed during assessment. Pt resistive to PO trial with ice chip. Pt did not attempt to retrieve bolus from spoon despite cues. SLP eventually discontinued trial due to poor awareness of bolus and high aspiration risk.      Outpatient Education:  Adult Outpatient Education  Individual(s) Educated:  (family)  Verbal Education : Results and recommendations from assessment  Risk and Benefits Discussed with Patient/Caregiver/Other: yes  Patient Response to Education: Patient/Caregiver Verbalized Understanding of Information

## 2023-10-16 NOTE — CARE PLAN
Problem: Fall/Injury  Goal: Verbalize understanding of personal risk factors for fall in the hospital  Outcome: Not Progressing  Goal: Verbalize understanding of risk factor reduction measures to prevent injury from fall in the home  Outcome: Not Progressing  Goal: Use assistive devices by end of the shift  Outcome: Not Progressing  Goal: Pace activities to prevent fatigue by end of the shift  Outcome: Not Progressing     Problem: Skin  Goal: Prevent/manage excess moisture  Flowsheets (Taken 10/16/2023 0246)  Prevent/manage excess moisture: Cleanse incontinence/protect with barrier cream  Goal: Prevent/minimize sheer/friction injuries  Flowsheets (Taken 10/16/2023 0246)  Prevent/minimize sheer/friction injuries: Turn/reposition every 2 hours/use positioning/transfer devices  Goal: Promote/optimize nutrition  Flowsheets (Taken 10/16/2023 0246)  Promote/optimize nutrition: Discuss with provider if NPO > 2 days  Goal: Promote skin healing  Flowsheets (Taken 10/16/2023 0246)  Promote skin healing: Turn/reposition every 2 hours/use positioning/transfer devices   The patient's goals for the shift include unable to assess    The clinical goals for the shift include unable to assess        Problem: Fall/Injury  Goal: Verbalize understanding of personal risk factors for fall in the hospital  Outcome: Not Progressing  Goal: Verbalize understanding of risk factor reduction measures to prevent injury from fall in the home  Outcome: Not Progressing  Goal: Use assistive devices by end of the shift  Outcome: Not Progressing  Goal: Pace activities to prevent fatigue by end of the shift  Outcome: Not Progressing

## 2023-10-16 NOTE — PROGRESS NOTES
"Physical Therapy                 Therapy Communication Note    Patient Name: Lauren Reyna  MRN: [id number]  Today's Date: 10/16/2023     Discipline: Physical Therapy    Missed Visit Reason:      Missed Time: Attempt: RN advised to HOLD PT today. RN states pt is minimally responsive and  unable to follow commands.    Comment: This PT called staff at [facility] where pt is in LTC, spoke to nursing staff for PLOF:  Wheelchair bound, assist x1 or 2 of staff (depending on pt's participation/attitude), in bathroom needs \"pivot disc\" on floor under feet to transfer wheelchair<>toilet (tends to \"plop\", can feed self, needs Ax1 for other ADL's.     Attempt PT EVAL 10/17 if pt more alert and able to participate.   "

## 2023-10-16 NOTE — SIGNIFICANT EVENT
Event:   Informed by patient's nurse that she was having increased work of breathing and was requiring 3 L nasal cannula to maintain oxygen sats.  Patient is on fluids since this morning and has had her Lasix held in setting of permissive hypertension.  Chart quickly reviewed, patient with HFpEF; multiple other comorbidities; presented as a stroke alert and has significant expressive aphasia in setting of this.  Patient evaluated (see below)    Physical Exam  Vitals and nursing note reviewed.   Constitutional:       General: She is in acute distress (Belly breathing).      Appearance: She is ill-appearing. She is not toxic-appearing.   HENT:      Mouth/Throat:      Mouth: Mucous membranes are dry.   Eyes:      Comments: Lashes with yellow discharge bilaterally; anicteric sclera    Cardiovascular:      Rate and Rhythm: Normal rate and regular rhythm.   Pulmonary:      Effort: Respiratory distress (Tachypneic with belly breathing) present.      Breath sounds: No stridor. Rhonchi present.      Comments: Diminished airflow   3L NC  Chest:      Chest wall: No tenderness.   Abdominal:      General: There is no distension.      Palpations: Abdomen is soft.      Tenderness: There is no abdominal tenderness.      Comments: Belly breathing   Musculoskeletal:      Cervical back: Normal range of motion.   Skin:     General: Skin is warm and dry.      Capillary Refill: Capillary refill takes less than 2 seconds.   Neurological:      Mental Status: She is alert. Mental status is at baseline.      Comments: At most recent baseline from chart review, following some commands; significant expressive aphasia   Psychiatric:      Comments: Unable to assess        Initial plan/management strategies  1.  Obtain stat CXR  2.  Obtain BNP, VBG  3.  One-time 20 mg IV Lasix push  4.  Orders placed for nasal cannula and respiratory consult as per protocol  5.  As more results come in, will update assessment and plan    Dr. Caban notified of the  above.    Interval update 6881  -Patient's CXR does not have a read; however, compared to prior in the system there does appear to be some interstitial edema.  -BNP is up to 704 from <200 on admission  -VBG: pH 7.45, PCO2 42, PO2 57, calculated bicarb 29.2  -We will give patient an additional one-time 20 mg IV push Lasix tonight, will schedule 20 mg IV push twice daily going into tomorrow  -Have stopped IV fluids  -We will also repeat VBG in a.m., follow vital signs and patient's status overnight.  -Patient has echocardiogram pending already    Acute diagnoses   1.)  Acute decompensated heart failure  2.)  Acute hypoxic respiratory failure    Active hospital problems, chronic conditions   1.)  Right hemiplegia and expressive aphasia, suspect secondary to stroke  2.)  Atrial fibrillation, not currently on anticoagulation  3.)  Primary hypertension  4.)  Mixed hyperlipidemia  5.)  Symptomatic bradycardia s/p pacer (2015)  6.) COPD   7.) CAD  8.) NIDDM-II   9.) CODE STATUS: DNR/DNI     I spent 45 minutes in the overall management and evaluation of the acute change in this patient's condition, not including time spent in chart review and discussion with patient's nurse, respiratory therapy, and Dr. Caban.   Critical care time on 10/15/23 = 45 minutes.

## 2023-10-16 NOTE — PROGRESS NOTES
Notified by May Marin RN [facility] that pt is from [facility]. A RTN referral will be sent

## 2023-10-16 NOTE — PROGRESS NOTES
"Lauren Reyna is a 89 y.o. female on day 2 of admission presenting with Right hemiplegia (CMS/HCC).      Subjective   Attempted to see patient this afternoon around 1320. Getting bedside echo done. Will return to speak with patient and family.     Discussed with LIZABETH Olivia, states today patient is very confused and restless, pulling at her IV line and monitors. Pt needing bilateral soft wrist restraints.     HCT repeated today. Report and Images reviewed by myself in PACS. \"Interval development hypoattenuation left occipital-temporal lobe, compatible with an acute left PCA distribution infarct. Local mass effect without midline shift. Chronic microvascular ischemia involutional changes.\"    Returned to see patient at 1420. Currently, she is sleeping soundly. Family at bedside including son, daughter-in-law and granddaughter. PHI remains paralyzed. Full neuro exam deferred at this time due to prior agitation and pt is now calm and sleeping. Increased somnolence reported.        Objective     Last Recorded Vitals  Blood pressure 145/56, pulse 63, temperature 36.9 °C (98.4 °F), temperature source Temporal, resp. rate 18, height 1.6 m (5' 3\"), weight 61.4 kg (135 lb 5.8 oz), SpO2 95 %.    Physical Exam  Vitals and nursing note reviewed.       Neurological Exam  Mental Status   Level of consciousness: Somnolent  Full examination deferred as patient is now calm and sleeping.    Relevant Results    Scheduled medications  aspirin, 81 mg, oral, Daily  atorvastatin, 80 mg, oral, Nightly  clopidogrel, 75 mg, oral, Daily  furosemide, 20 mg, intravenous, q12h  perflutren lipid microspheres, 0.5-10 mL of dilution, intravenous, Once in imaging      Continuous medications  [Held by provider] lactated Ringer's, 75 mL/hr, Last Rate: 75 mL/hr (10/16/23 0534)      PRN medications  PRN medications: hydrALAZINE **FOLLOWED BY** hydrALAZINE, labetaloL, oxygen, polyethylene glycol    Results for orders placed or performed during the hospital " encounter of 10/14/23 (from the past 24 hour(s))   BLOOD GAS VENOUS FULL PANEL   Result Value Ref Range    POCT pH, Venous 7.45 (H) 7.33 - 7.43 pH    POCT pCO2, Venous 42 41 - 51 mm Hg    POCT pO2, Venous 57 (H) 35 - 45 mm Hg    POCT SO2, Venous 87 (H) 45 - 75 %    POCT Oxy Hemoglobin, Venous 84.4 (H) 45.0 - 75.0 %    POCT Hematocrit Calculated, Venous 36.0 36.0 - 46.0 %    POCT Sodium, Venous 136 136 - 145 mmol/L    POCT Potassium, Venous 3.8 3.5 - 5.3 mmol/L    POCT Chloride, Venous 100 98 - 107 mmol/L    POCT Ionized Calicum, Venous 1.15 1.10 - 1.33 mmol/L    POCT Glucose, Venous 86 74 - 99 mg/dL    POCT Lactate, Venous 1.4 0.4 - 2.0 mmol/L    POCT Base Excess, Venous 4.7 (H) -2.0 - 3.0 mmol/L    POCT HCO3 Calculated, Venous 29.2 (H) 22.0 - 26.0 mmol/L    POCT Hemoglobin, Venous 12.1 12.0 - 16.0 g/dL    POCT Anion Gap, Venous 11.0 10.0 - 25.0 mmol/L    Patient Temperature 37.0 degrees Celsius    FiO2 32 %    Flow 3.0 LPM   B-type natriuretic peptide   Result Value Ref Range     (H) 0 - 99 pg/mL   Comprehensive metabolic panel   Result Value Ref Range    Glucose 78 74 - 99 mg/dL    Sodium 138 136 - 145 mmol/L    Potassium 3.4 (L) 3.5 - 5.3 mmol/L    Chloride 98 98 - 107 mmol/L    Bicarbonate 26 21 - 32 mmol/L    Anion Gap 17 10 - 20 mmol/L    Urea Nitrogen 22 6 - 23 mg/dL    Creatinine 0.78 0.50 - 1.05 mg/dL    eGFR 70 >60 mL/min/1.73m*2    Calcium 9.0 8.6 - 10.3 mg/dL    Albumin 3.1 (L) 3.4 - 5.0 g/dL    Alkaline Phosphatase 169 (H) 33 - 136 U/L    Total Protein 6.8 6.4 - 8.2 g/dL    AST 30 9 - 39 U/L    Bilirubin, Total 0.9 0.0 - 1.2 mg/dL    ALT 19 7 - 45 U/L   CBC   Result Value Ref Range    WBC 13.7 (H) 4.4 - 11.3 x10*3/uL    nRBC 0.0 0.0 - 0.0 /100 WBCs    RBC 4.17 4.00 - 5.20 x10*6/uL    Hemoglobin 11.9 (L) 12.0 - 16.0 g/dL    Hematocrit 36.9 36.0 - 46.0 %    MCV 89 80 - 100 fL    MCH 28.5 26.0 - 34.0 pg    MCHC 32.2 32.0 - 36.0 g/dL    RDW 16.1 (H) 11.5 - 14.5 %    Platelets 303 150 - 450 x10*3/uL     MPV 8.9 7.5 - 11.5 fL   Magnesium   Result Value Ref Range    Magnesium 1.91 1.60 - 2.40 mg/dL   BLOOD GAS VENOUS FULL PANEL   Result Value Ref Range    POCT pH, Venous 7.54 (H) 7.33 - 7.43 pH    POCT pCO2, Venous 33 (L) 41 - 51 mm Hg    POCT pO2, Venous 82 (H) 35 - 45 mm Hg    POCT SO2, Venous 98 (H) 45 - 75 %    POCT Oxy Hemoglobin, Venous 95.3 (H) 45.0 - 75.0 %    POCT Hematocrit Calculated, Venous 38.0 36.0 - 46.0 %    POCT Sodium, Venous 134 (L) 136 - 145 mmol/L    POCT Potassium, Venous 3.4 (L) 3.5 - 5.3 mmol/L    POCT Chloride, Venous 99 98 - 107 mmol/L    POCT Ionized Calicum, Venous 1.10 1.10 - 1.33 mmol/L    POCT Glucose, Venous 83 74 - 99 mg/dL    POCT Lactate, Venous 1.4 0.4 - 2.0 mmol/L    POCT Base Excess, Venous 5.8 (H) -2.0 - 3.0 mmol/L    POCT HCO3 Calculated, Venous 28.2 (H) 22.0 - 26.0 mmol/L    POCT Hemoglobin, Venous 12.6 12.0 - 16.0 g/dL    POCT Anion Gap, Venous 10.0 10.0 - 25.0 mmol/L    Patient Temperature 37.0 degrees Celsius    FiO2 32 %   Urinalysis with Reflex Microscopic and Culture   Result Value Ref Range    Color, Urine Yellow Straw, Yellow    Appearance, Urine Clear Clear    Specific Gravity, Urine 1.009 1.005 - 1.035    pH, Urine 6.0 5.0, 5.5, 6.0, 6.5, 7.0, 7.5, 8.0    Protein, Urine NEGATIVE NEGATIVE mg/dL    Glucose, Urine NEGATIVE NEGATIVE mg/dL    Blood, Urine MODERATE (2+) (A) NEGATIVE    Ketones, Urine NEGATIVE NEGATIVE mg/dL    Bilirubin, Urine NEGATIVE NEGATIVE    Urobilinogen, Urine <2.0 <2.0 mg/dL    Nitrite, Urine NEGATIVE NEGATIVE    Leukocyte Esterase, Urine MODERATE (2+) (A) NEGATIVE   Urinalysis Microscopic Only   Result Value Ref Range    WBC, Urine 1-5 1-5, NONE /HPF    RBC, Urine 1-2 NONE, 1-2, 3-5 /HPF     CT head wo IV contrast    Result Date: 10/16/2023  Interpreted By:  Russel Asif, STUDY: CT HEAD WO IV CONTRAST;  10/16/2023 12:31 pm   INDICATION: Signs/Symptoms:R hemiparesis since 10/14, unable to get MRI 2/2 PPM.   COMPARISON: None.   ACCESSION  NUMBER(S): [id number]   ORDERING CLINICIAN: XANDER SANDOVAL   TECHNIQUE: Noncontrast axial CT scan of head was performed.   FINDINGS: Parenchyma: Interval development large area of hypoattenuation in the left occipital temporal lobe (series 2 images 2-13, series 6 images ) with loss of gray-white matter differentiation and effacement of the surrounding sulci and gyri, concerning for an acute left PCA distribution infarct. No midline shift. No evidence of intracranial hemorrhage. Patchy supratentorial hypodensity, nonspecific, but likely secondary to moderate chronic microvascular ischemia.   CSF Spaces: Moderate generalized volume loss with concordant ventriculomegaly.   Extra-Axial Fluid: There is no extraaxial fluid collection.   Calvarium: The calvarium is unremarkable.   Paranasal sinuses: Visualized paranasal sinuses are clear.   Mastoids: Clear.   Orbits: Normal.   Soft tissues: Unremarkable.       Interval development hypoattenuation left occipital-temporal lobe, compatible with an acute left PCA distribution infarct. Local mass effect without midline shift. Chronic microvascular ischemia involutional changes.   Russel Asif discussed the significance and urgency of this critical finding by epic chat with  XANDER SANDOVAL on 10/16/2023 at 1:08 pm.  (**-RCF-**) Findings:  See findings.     Signed by: Russel Asif 10/16/2023 1:10 PM Dictation workstation:   [id number]    XR chest 1 view    Result Date: 10/15/2023  Interpreted By:  Anatoliy Bhandari, STUDY: XR CHEST 1 VIEW;  10/15/2023 9:55 pm   INDICATION: Signs/Symptoms:SOB, wheezing, new oxygen requirement (lasix held for permissive HTN).   COMPARISON: 02/26/2023.   ACCESSION NUMBER(S): [id number]   ORDERING CLINICIAN: LIZBETH DENG   FINDINGS: Heart size is enlarged. Edema pattern is seen. No pneumothorax. Small left pleural effusion with left basilar atelectasis.       1.  Features suggesting e pulmonary tyson. Heart size is enlarged. Probable  left pleural effusion with left basilar atelectasis. Pneumonia not categorically excluded       MACRO: None   Signed by: Anatoliy Bhandari 10/15/2023 10:56 PM Dictation workstation:   [id number]    CT angio brain attack head w IV contrast and post procedure    Result Date: 10/14/2023  Interpreted By:  Charly Grier, STUDY: CT ANGIO BRAIN ATTACK NECK W IV CONTRAST AND POST PROCEDURE; CT ANGIO BRAIN ATTACK HEAD W IV CONTRAST AND POST PROCEDURE; ;  10/14/2023 9:23 am   INDICATION: Signs/Symptoms:Stroke Evaluation with VAN Positive.   COMPARISON: None.   ACCESSION NUMBER(S): [id number]; [id number]   ORDERING CLINICIAN: GIL FLORES   TECHNIQUE: CTA of the head and neck was performed after administration of 50 mL of Omnipaque 350 intravenously. Segmented MIPs are provided.   Of note, repeat CTA of the head and neck was performed due to patient motion but the repeat images demonstrate diminished opacification of the intracranial arteries and therefore essentially nondiagnostic.   FINDINGS: Evaluation is degraded due to patient motion.   CTA head:   Evaluation is heavily degraded due to patient motion, noting this, atherosclerotic calcifications are seen within the cavernous and paraophthalmic segments of the bilateral internal carotid arteries and within the precavernous segment of the left internal carotid artery. There is at most mild-to-moderate luminal narrowing involving the clinoid segment of the right ICA. Otherwise, there is no striking narrowing of the visualized internal carotid arteries.   A1 segment of the right anterior cerebral artery is smaller in luminal caliber compared to the left, likely developmental variant. Otherwise, there is no striking narrowing of the visualized portions of the bilateral anterior cerebral arteries. The M1 segments of the bilateral middle cerebral arteries are well visualized and there is mild luminal irregularity and at most slight luminal narrowing involving the M1 segment of the  right MCA. Otherwise, there is no striking narrowing of the visualized portions of the bilateral middle cerebral arteries or evidence of abrupt cutoff of the middle cerebral arteries.   The visualized portions of the bilateral intradural vertebral arteries and basilar artery demonstrate no luminal narrowing. The right vertebral artery is dominant. There is no narrowing of the visualized portion of the right posterior cerebral artery. There is lack of contrast opacification seen within majority of the P1 segment of the left posterior cerebral artery. Some contrast related opacification is seen within the P2 segment with mild luminal narrowing. There is again loss of contrast opacification within the left PCA located in the ambient cistern with some contrast related enhancement seen within the PCA located within the quadrigeminal cistern and distally.   There are no aneurysms.   CTA neck:   The aortic arch contains atherosclerotic calcifications without luminal narrowing. Atherosclerotic calcifications within the origins of the great arteries arising from the aortic arch without luminal narrowing. Bilateral common carotid arteries are tortuous and have a retropharyngeal course. There are coarse atherosclerotic calcifications located within the bilateral carotid bulbs and proximal cervical internal carotid arteries and there is likely greater than 80% narrowing involving the right carotid bulb and probably greater than 70% narrowing involving the left carotid bulb, noting that luminal narrowing may be exaggerated due to presence of coarse atherosclerotic calcifications. Otherwise, there is no striking narrowing of the remaining visualized portions of the bilateral cervical internal carotid arteries.   Striking narrowing of the visualized portions of bilateral vertebral arteries. The vertebral arteries are tortuous and the right vertebral artery is dominant.   Additional findings:   There are osseous degenerative  changes of the cervical spine.   There is a partially visualized left pleural effusion.   Redemonstration of a patchy opacity located within the upper lobe of the left lung, also seen on the prior CT chest from 10/11/2022 which may be inflammatory or neoplastic in etiology. There is a partially visualized left chest pacemaker/AICD device with lead eventually extending into the superior vena cava.       Evaluation is degraded due to patient motion.   1. No evidence of acute occlusion of the visualized anterior intracranial arteries.   2. Markedly diminished contrast opacification throughout the left posterior cerebral artery may be chronic in etiology, correlate clinically.   3. Atherosclerotic calcifications involving the intracranial arteries as detailed above   CTA neck:   1. Coarse atherosclerotic calcifications involving the bilateral carotid bulbs causing likely marked luminal narrowing. If clinically warranted, consider obtaining MRA of the neck which may better evaluate true luminal narrowing.   2. Otherwise, no striking narrowing of the visualized arteries in the neck.   3. Redemonstration of nonspecific opacity within the upper lobe of the left lung which given stability since 2022 could reflect a neoplastic or inflammatory process. Consider dedicated CT of the chest for further evaluation.   This study was interpreted at [facility].     MACRO: None   Signed by: Charly Grier 10/14/2023 9:47 AM Dictation workstation:   [id number]    CT angio brain attack neck w IV contrast and post procedure    Result Date: 10/14/2023  Interpreted By:  Charly Grier, STUDY: CT ANGIO BRAIN ATTACK NECK W IV CONTRAST AND POST PROCEDURE; CT ANGIO BRAIN ATTACK HEAD W IV CONTRAST AND POST PROCEDURE; ;  10/14/2023 9:23 am   INDICATION: Signs/Symptoms:Stroke Evaluation with VAN Positive.   COMPARISON: None.   ACCESSION NUMBER(S): [id number]; [id number]   ORDERING CLINICIAN: GIL FLORES    TECHNIQUE: CTA of the head and neck was performed after administration of 50 mL of Omnipaque 350 intravenously. Segmented MIPs are provided.   Of note, repeat CTA of the head and neck was performed due to patient motion but the repeat images demonstrate diminished opacification of the intracranial arteries and therefore essentially nondiagnostic.   FINDINGS: Evaluation is degraded due to patient motion.   CTA head:   Evaluation is heavily degraded due to patient motion, noting this, atherosclerotic calcifications are seen within the cavernous and paraophthalmic segments of the bilateral internal carotid arteries and within the precavernous segment of the left internal carotid artery. There is at most mild-to-moderate luminal narrowing involving the clinoid segment of the right ICA. Otherwise, there is no striking narrowing of the visualized internal carotid arteries.   A1 segment of the right anterior cerebral artery is smaller in luminal caliber compared to the left, likely developmental variant. Otherwise, there is no striking narrowing of the visualized portions of the bilateral anterior cerebral arteries. The M1 segments of the bilateral middle cerebral arteries are well visualized and there is mild luminal irregularity and at most slight luminal narrowing involving the M1 segment of the right MCA. Otherwise, there is no striking narrowing of the visualized portions of the bilateral middle cerebral arteries or evidence of abrupt cutoff of the middle cerebral arteries.   The visualized portions of the bilateral intradural vertebral arteries and basilar artery demonstrate no luminal narrowing. The right vertebral artery is dominant. There is no narrowing of the visualized portion of the right posterior cerebral artery. There is lack of contrast opacification seen within majority of the P1 segment of the left posterior cerebral artery. Some contrast related opacification is seen within the P2 segment with mild  luminal narrowing. There is again loss of contrast opacification within the left PCA located in the ambient cistern with some contrast related enhancement seen within the PCA located within the quadrigeminal cistern and distally.   There are no aneurysms.   CTA neck:   The aortic arch contains atherosclerotic calcifications without luminal narrowing. Atherosclerotic calcifications within the origins of the great arteries arising from the aortic arch without luminal narrowing. Bilateral common carotid arteries are tortuous and have a retropharyngeal course. There are coarse atherosclerotic calcifications located within the bilateral carotid bulbs and proximal cervical internal carotid arteries and there is likely greater than 80% narrowing involving the right carotid bulb and probably greater than 70% narrowing involving the left carotid bulb, noting that luminal narrowing may be exaggerated due to presence of coarse atherosclerotic calcifications. Otherwise, there is no striking narrowing of the remaining visualized portions of the bilateral cervical internal carotid arteries.   Striking narrowing of the visualized portions of bilateral vertebral arteries. The vertebral arteries are tortuous and the right vertebral artery is dominant.   Additional findings:   There are osseous degenerative changes of the cervical spine.   There is a partially visualized left pleural effusion.   Redemonstration of a patchy opacity located within the upper lobe of the left lung, also seen on the prior CT chest from 10/11/2022 which may be inflammatory or neoplastic in etiology. There is a partially visualized left chest pacemaker/AICD device with lead eventually extending into the superior vena cava.       Evaluation is degraded due to patient motion.   1. No evidence of acute occlusion of the visualized anterior intracranial arteries.   2. Markedly diminished contrast opacification throughout the left posterior cerebral artery may be  chronic in etiology, correlate clinically.   3. Atherosclerotic calcifications involving the intracranial arteries as detailed above   CTA neck:   1. Coarse atherosclerotic calcifications involving the bilateral carotid bulbs causing likely marked luminal narrowing. If clinically warranted, consider obtaining MRA of the neck which may better evaluate true luminal narrowing.   2. Otherwise, no striking narrowing of the visualized arteries in the neck.   3. Redemonstration of nonspecific opacity within the upper lobe of the left lung which given stability since 2022 could reflect a neoplastic or inflammatory process. Consider dedicated CT of the chest for further evaluation.   This study was interpreted at [facility].     MACRO: None   Signed by: Charly Grier 10/14/2023 9:47 AM Dictation workstation:   [id number]    CT brain attack head wo IV contrast    Result Date: 10/14/2023  Interpreted By:  Di Urban, STUDY: CT BRAIN ATTACK HEAD WO IV CONTRAST;  10/14/2023 9:10 am   INDICATION: Signs/Symptoms:Stroke Evaluation.   COMPARISON: 02/26/2023   ACCESSION NUMBER(S): [id number]   ORDERING CLINICIAN: GIL FLORES   TECHNIQUE: Unenhanced CT images of the head were obtained.   FINDINGS: The ventricles, cisterns and sulci are enlarged, consistent with diffuse volume loss. There are areas of nonspecific white matter hypodensity, which are probably age related or microvascular in nature. These findings are similar to the previous exam. There is no acute intracranial hemorrhage, mass effect or midline shift. No extraaxial fluid collection.   No focal calvarial lesion.   Visualized paranasal sinuses are clear.       No acute intracranial hemorrhage or mass-effect.   MACRO: Di Urban discussed the significance and urgency of this critical finding by telephone with  GIL FLORES on 10/14/2023 at 9:13 am. (**-RCF-**) Findings:  See findings.     Signed by: Di Urban 10/14/2023 9:15  AM Dictation workstation:   [id number]           NIH Stroke Scale  1A. Level of Consciousness: Arouses to Minor Stimulation  1B. Ask Month and Age: No Questions Right  1C. Blink Eyes & Squeeze Hands: Performs 0 Tasks  2. Best Gaze: Normal  3. Visual: No Visual Loss  4. Facial Palsy: Partial Paralysis  5A. Motor - Left Arm: No Drift  5B. Motor - Right Arm: No Effort Against Gravity  6A. Motor - Left Leg: No Drift  6B. Motor - Right Leg: Some Effort Against Gravity  7. Limb Ataxia: Absent  8. Sensory Loss: Severe-to-Total Sensory Loss  9. Best Language: Severe Aphasia  10. Dysarthria: Normal  11. Extinction and Inattention: No Abnormality  NIH Stroke Scale: 16           Elizabeth Coma Scale  Best Eye Response: To verbal stimuli  Best Verbal Response: Confused  Best Motor Response: Withdraws to pain  King Coma Scale Score: 11                Assessment/Plan   This patient currently has cardiac telemetry ordered; if you would like to modify or discontinue the telemetry order, click here to go to the orders activity to modify/discontinue the order.  Principal Problem:    Right hemiplegia (CMS/HCC)  Active Problems:    Centrilobular emphysema (CMS/HCC)    Acquired hypothyroidism    Mild episode of recurrent major depressive disorder (CMS/HCC)    Gastroesophageal reflux disease without esophagitis    Overactive bladder    Coronary artery disease involving native coronary artery of native heart without angina pectoris    Paroxysmal atrial fibrillation (CMS/HCC)    HFrEF (heart failure with reduced ejection fraction) (CMS/HCC)    Type 2 diabetes mellitus without complication, without long-term current use of insulin (CMS/HCC)    Cerebrovascular accident (CVA), unspecified mechanism (CMS/HCC)    IMPRESSION:  Acute L PCA infarct affecting temporal and occipital lobe  Unable to get MRI due to PPM, repeat HCT with new hypoattenuation  On plavix and statin prior to admission  Sx of R sided paralysis and severe aphasia  Normally  A&O x 3, wheelchair bound  Echo done, pending results  Altered mental status  Bilateral carotid stenosis   CTA limited but estimate 80% R ICA and 70% LICA    RECOMMENDATIONS:  Continue supportive care.   Continue PT/OT/SLP as able.  Echo pending results  Continue DAPT and daily statin. Discussed with family. Pt does have reported hx of afib and possibly could be on OAC for further stroke prevention but risk of bleeding and falls r/t age and mental status, family not wanting to pursue this currently.   Consider further discussion about goals of care/palliative care.  Family not wanting vascular surgery consult at this time.   Repeat HCT tomorrow to monitor swelling, family agreeable to this    Discussed with RN and family. All questions answered. Case managed with Dr. Brewer.   Will continue to follow.          I spent 60+ minutes in the professional and overall care of this patient.      Humaira Moe, APRN-CNP

## 2023-10-17 NOTE — PROGRESS NOTES
Lauren Reyna is a 89 y.o. female on day 3 of admission presenting with Right hemiplegia (CMS/HCC).    Subjective   Lauren Reyna is a 89 y.o. 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     Patient resides at [facility]. Per her son she was in her usual state of health until yesterday morning when she began acting confused. Normally she is able to converse and is AOx3. She is wheelchair bound and requires assistance for transfers. This morning she was found to be somnolent and with what appeared to be R sided paralysis. She was brought to the ED. Her NIH score was 25 in the ED. She was out of the window for thrombolytics. On my interview she is extremely somnolent and only opens eyes to voice then immediately goes back to sleep.      I did confirm with patients son Félix patient is DNR/DNI.     10/15: Patient is more awake and alert today however she is still unable to follow commands or engage in meaningful speech.      10/16: She remains encephalopathic. Overnight she desaturated and was found to be volume overloaded. She was started on IV lasix. I broached the topic of hospice with patients family. They were receptive and will discuss with other family and get back to me.     10/17: Patient family has elected for her to change her code status to DNRCC and pursue hospice. I have placed the hospice order set. SW will choice family today. Plan will be for her to go back to [facility]. I spent 35 minutes discussing advanced care planning with patient family.        Objective   Gen: Not in acute distress  Head/Neck: NCAT  Eyes: Anicteric sclerae, noninjected conjunctivae  Nose: No rhinorrhea  Mouth:  MMM  Heart: Regular rate and  "rhythm w/ no murmurs, rubs, gallops  Lungs: CTAB w/o wheezes, rales, rhonchi, no increased work of breathing  Abdomen: Soft, NT/ND  Musculoskeletal: No deformities  Extremities: No edema.  Neurologic: Please see below for NIHSS  Skin: No rashes noted  Psychological: Calm        1a         Level of consciousness:           1=not alert but arousable by minor stimulation to obey, answer or respond  1b.        LOC questions:            2=Performs neither task correctly  1c.        LOC commands:          2=Performs neither task correctly  2.          Best Gaze:        1=partial gaze palsy  3.          Visual:  1=Partial hemianopia  4.          Facial Palsy:      1=Minor paralysis (flattened nasolabial fold, asymmetric on smiling)  5a.        Motor left arm:            1=Drift, limb holds 90 (or 45) degrees but drifts down before full 10 seconds: does not hit bed  5b.        Motor right arm:          4=No movement  6a.        motor left le=Some effort against gravity, limb cannot get to or maintain (if cured) 90 (or 45) degrees, drifts down to bed, but has some effort against gravity  6b        Motor right leg:           3=No effort against gravity, limb falls  7.          Limb Ataxia:     0  8.          Sensory:           2=Severe to total sensory loss; patient is not aware of being touched in face, arm, leg  9.          Best Language:            3=Mute, global aphasia; no usable speech or auditory comprehension  10.        Dysarthria:       2=Severe; patient speech is so slurred as to be unintelligible in the absence of or our of proportion to any dysphagia, or is mute/anarthric  11.        Extinction and Inattention:      0=No abnormality  12.        Distal motor function: 0=Normal               Total:     25    Last Recorded Vitals  Blood pressure 135/58, pulse 60, temperature 36.3 °C (97.4 °F), temperature source Temporal, resp. rate 16, height 1.6 m (5' 3\"), weight 61.4 kg (135 lb 5.8 oz), SpO2 95 " %.  Intake/Output last 3 Shifts:  I/O last 3 completed shifts:  In: 450 (7.3 mL/kg) [I.V.:450 (7.3 mL/kg)]  Out: 1150 (18.7 mL/kg) [Urine:1150 (0.5 mL/kg/hr)]  Weight: 61.4 kg     Relevant Results                 Assessment/Plan     Acute L PCA Distribution Infarct  -She has a hx of pAF, but is not on OAC   -She is transitioning to hospice      AOC HFrEF (heart failure with reduced ejection fraction)  -She is transitioning to hospice     Acute Hypoxemic Respiratory Failure  -as demonstrated by patient desaturating to <89% on RA  -2/2 above  -She is transitioning to hospice     Paroxysmal atrial fibrillation (CMS/HCC)  Coronary artery disease involving native coronary artery of native heart without angina pectoris  Type 2 diabetes mellitus without complication, without long-term current use of insulin (CMS/HCC)  Centrilobular emphysema (CMS/HCC)  Acquired hypothyroidism  Major depressive disorder (CMS/HCC)  Gastroesophageal reflux disease without esophagitis  Overactive bladder    Disposition   -Patients family has changed her code status to DNRCC and are transitioning to hospice.     Radhames Ahuja MD PhD

## 2023-10-17 NOTE — CARE PLAN
The patient's goals for the shift include unable to assess    The clinical goals for the shift include no falls throughout this shift    Over the shift, the patient did not make progress toward the following goals. Barriers to progression include decreased orientation. Recommendations to address these barriers include n/a.

## 2023-10-17 NOTE — CARE PLAN
The patient's goals for the shift include unable to assess    The clinical goals for the shift include no falls throughout this shift

## 2023-10-17 NOTE — PROGRESS NOTES
Physical Therapy    Physical Therapy Evaluation    Patient Name: Lauren Reyna  MRN: [id number]  Today's Date: 10/17/2023   Time Calculation  Start Time: 1023  Stop Time: 1041  Time Calculation (min): 18 min    Assessment/Plan   PT Assessment  PT Assessment Results: Decreased strength, Decreased endurance, Impaired balance, Decreased mobility, Decreased cognition, Impaired judgement, Decreased safety awareness, Impaired hearing  Rehab Prognosis: Fair  Evaluation/Treatment Tolerance: Patient limited by fatigue, Treatment limited secondary to agitation  Medical Staff Made Aware: Yes  Strengths: Support of extended family/friends  Barriers to Participation: Ability to acquire knowledge  End of Session Communication: Bedside nurse  Assessment Comment: DECREASED ALERTNESS, ABILTIY TO FOLLOW DIRECTIONS, DECRESED STRENGTH FABIOLA ON R, POOR SIT BALACNE DEP X2 FOR BED M OBITYTODAY, RESTLESS IN BED, WILL NEED SKILLED REHAB ON DISCH  End of Session Patient Position: Bed, 4 rail up, Alarm on (CALL LIGHT IN REACH DIL PRESENT)  IP OR SWING BED PT PLAN  Inpatient or Swing Bed: Inpatient  PT Plan  Treatment/Interventions: Bed mobility, Transfer training, Strengthening  PT Plan: Skilled PT  PT Frequency: 4 times per week  PT Discharge Recommendations: Moderate intensity level of continued care  Equipment Recommended upon Discharge:  (TBD)  PT Recommended Transfer Status: Assist x2 (BED LEVEL)      Subjective   General Visit Information:  General  Reason for Referral: STROKE  Referred By: JAIME  Past Medical History Relevant to Rehab: R JAN; DX: CVA; HX: HTN CAD, DM, AFIB, CECELIA,PACER, HYPOTHYROID, OAB, COPD, ANEMIA, MDD  Family/Caregiver Present: Yes  Caregiver Feedback: SON AND DIL, PT. AMB WITH P.T. A WITH ADL AT NH, A/0X3 AT BASELINE  Co-Treatment: OT  Prior to Session Communication: Bedside nurse  Patient Position Received: Bed, 3 rail up, Alarm on (LUE RESTRAINT)  General Comment: PT. RESTLESS IN BED, LUE RESTRAINT,  4L O2 NC,   SON MARTIN PRESENT  Home Living:  Home Living  Home Living Comments: LIVES AT [facility], AMB WITH P.T. A WITH ADL, SPENDS TIMEIN        Precautions:  Precautions  Hearing/Visual Limitations: [place]  Medical Precautions: Neuro precautions, Fall precautions, Oxygen therapy device and L/min (4L O2)  Precautions Comment: LUE RESTRAINT         Objective   Pain:     Cognition:  Cognition  Overall Cognitive Status: Impaired  Orientation Level: Unable to assess (PT. RESTELESS IN BED  DOESNOT FOLLOW DIRECTIONS)  Memory: Unable to assess  Impulsive: Severely    General Assessments:  General Observation  General Observation: POOR HEAD CONTROL IN SITTING FOR 30 SEC, KEEPS EYES CLOSED MO ST OF THE SESSION             Activity Tolerance  Endurance: Tolerates less than 10 min exercise, no significant change in vital signs    Sensation  Sensation Comment: NOT ABLE TO ASSESS    Strength  Strength Comments: DEMOS ROM WFL WOLF LE'S; STRENGTH- LLE 3-/5, RLE 2/5                     Static Sitting Balance  Static Sitting-Comment/Number of Minutes: POOR BALANCE SAT EOB 30 SEC, SLUMPED IN SEATED POSITION KEPT EYES CLOSED; ?DIZZY VS FAINTED AS  WHEN SHE LAY DOWN SHE OPENED HER EYES       Functional Assessments:  Bed Mobility  Bed Mobility:  (ROLLING DEP X2, SUPNE<>SIT DEP X2, SCOOT UP IN BED DEP X2)                           Outcome Measures:  [facility] Basic Mobility  Turning from your back to your side while in a flat bed without using bedrails: Total  Moving from lying on your back to sitting on the side of a flat bed without using bedrails: Total  Moving to and from bed to chair (including a wheelchair): Total  Standing up from a chair using your arms (e.g. wheelchair or bedside chair): Total  To walk in hospital room: Total  Climbing 3-5 steps with railing: Total  Basic Mobility - Total Score: 6    Encounter Problems       Encounter Problems (Active)       Balance       SITTING (Not Progressing)       Start:  10/17/23    Expected End:   10/31/23       SIT EOB FOR 10 MIN MOD A X2, WHILE DOING LE EX            Mobility       STRENGTHENING (Not Progressing)       Start:  10/17/23    Expected End:  11/07/23       20 REPS  AAROM INCREASING STRENGTH TO PROGRESS ACTIVITY         BED MOBILTIY (Not Progressing)       Start:  10/17/23    Expected End:  10/31/23       MAX X2 MOBILTIY USING RAILS IN BED AND SUPINE<>SIT                Education Documentation  Mobility Training, taught by Alis Calle PT at 10/17/2023  1:11 PM.  Learner: Family, Patient  Readiness: Acceptance  Method: Explanation  Response: Verbalizes Understanding  Comment: TO DIL - IMPORTANCE OF MOBILITY AND ROLE OF IP THERAPIES    Education Comments  No comments found.

## 2023-10-17 NOTE — PROGRESS NOTES
Occupational Therapy    Evaluation    Patient Name: Lauren Reyna  MRN: [id number]  Today's Date: 10/17/2023  Time Calculation  Start Time: 1020  Stop Time: 1042  Time Calculation (min): 22 min        Assessment:  OT Assessment: R arm flaccid, dep. for all bed mobility and ADL care, unable to express self or follow directions  Prognosis: Fair  Medical Staff Made Aware: Yes  End of Session Communication: Bedside nurse  End of Session Patient Position: Bed, 4 rail up, Alarm on (CALL LIGHT IN REACH DIL PRESENT)  Prognosis: Fair  Medical Staff Made Aware: Yes  Plan:  Treatment Interventions: UE strengthening/ROM, Cognitive reorientation, Patient/family training  OT Frequency: 3 times per week  OT Discharge Recommendations: Moderate intensity level of continued care  Treatment Interventions: UE strengthening/ROM, Cognitive reorientation, Patient/family training    Subjective   Current Problem:  1. Cerebrovascular accident (CVA), unspecified mechanism (CMS/HCC)  Transthoracic Echo (TTE) Complete        General:  General  Reason for Referral: decreased ADLs  Referred By: Leigha  Past Medical History Relevant to Rehab: change in status, r/o CVA  Family/Caregiver Present:  (daughter-in-law present , son present at start but left prior to mobility)  Caregiver Feedback: dtr.in-law states pt. amb. wtih P.T. prior to change in status  Co-Treatment: PT  Co-Treatment Reason: assist of 2 for safe mobility  Prior to Session Communication: Charge Nurse  Patient Position Received: Bed, 3 rail up, Alarm on (wrist restraints)  General Comment: pt. moaning, groaning, [place],unable to express needs but seems uncomfortable upon approach  Precautions:  Medical Precautions: Fall precautions (wrist restraints)     Pain:  Pain Assessment  Pain Assessment: Lemos-Baker FACES  Pain Location:  (pt. unable to express where discomfort is)    Objective   Cognition:  Orientation Level: Unable to assess  Attention:  (impaired)           Home Living:  Type of  Home: Skilled Nursing facility ([facility])  Prior Function:  Level of Ethel:  (assist with all ADLs , except self-feeding)  Ambulatory Assistance:  (amb. with FWW with P.T. per family)  Hand Dominance:  (R-handed prior)  ADL:  Eating Assistance:  (NPO)  Grooming Assistance:  (Dep.)  Activity Tolerance:  Endurance:  (poor stevan. of static supported sitting EOB)  Bed Mobility/Transfers: Bed Mobility  Bed Mobility:  (Dep. X 2 for supine-to-sit and sit-to-supine and for rolling to L and R side)   and     Sensation:  Light Touch:  (unable to assess)  Strength:  Strength Comments: R UE flaccid all planes, L UE fair, able to hold arm raised against gravity  Perception:  Inattention/Neglect:  (unable to assess)  Coordination:  Movements are Fluid and Coordinated:  (Pt. able to use L hand to attempt removal of O2 nasal cannula)      Outcome Measures:[facility] Daily Activity  Putting on and taking off regular lower body clothing: Total  Bathing (including washing, rinsing, drying): Total  Putting on and taking off regular upper body clothing: Total  Toileting, which includes using toilet, bedpan or urinal: Total  Taking care of personal grooming such as brushing teeth: Total  Eating Meals: Total  Daily Activity - Total Score: 6        Education Documentation  ADL Training, taught by Clair Chow OT at 10/17/2023  1:08 PM.  Learner: Patient  Readiness: Acceptance  Method: Demonstration  Response: Needs Reinforcement  Comment: importance of bed mobility for pressure relief, PROM and positioning of R arm    Education Comments  No comments found.             Goals:  Encounter Problems       Encounter Problems (Active)       BALANCE       Demo. static sitting EOB for at least 2 mins. with Min. A support, alertness throughout and min. c/o discomfort  (Progressing)       Start:  10/17/23    Expected End:  10/24/23                       EXERCISE/STRENGTHENING       Patient and family will be educated on RUE PROM and  positioning HEP for patient awareness and comfort  (Progressing)       Start:  10/17/23    Expected End:  10/24/23

## 2023-10-17 NOTE — CARE PLAN
The patient's goals for the shift include unable to assess    The clinical goals for the shift include safety    Over the shift, the patient did not make progress toward the following goals.

## 2023-10-17 NOTE — PROGRESS NOTES
Per Dr. Ahuja pt is a hospice referral. Met with pt and family to discuss hospice options. Discussed [facility] (with financial disclosure) and community hospice options. Family chose [facility], referral placed in ACM, notified provider, bedside nurse, and TCC.  Agency will contact family to arrange meeting and discuss services.

## 2023-10-17 NOTE — CARE PLAN
Problem: Balance  Goal: SITTING  Description: SIT EOB FOR 10 MIN MOD A X2, WHILE DOING LE EX  Outcome: Not Progressing     Problem: Mobility  Goal: STRENGTHENING  Description: 20 REPS  AAROM INCREASING STRENGTH TO PROGRESS ACTIVITY  Outcome: Not Progressing  Goal: BED MOBILTIY  Description: MAX X2 MOBILTIY USING RAILS IN BED AND SUPINE<>SIT  Outcome: Not Progressing

## 2023-10-17 NOTE — PROGRESS NOTES
Occupational Therapy                 Therapy Communication Note    Patient Name: Lauren Reyna  MRN: [id number]  Today's Date: 10/17/2023     Discipline: Occupational Therapy    Received discontinue O.T. order from Dr. Ahuja 2/2 patient transitioning to hospice care.  Will discharge O.T. at this time.

## 2023-10-17 NOTE — CARE PLAN
Problem: EXERCISE/STRENGTHENING  Goal: Patient and family will be educated on RUE PROM and positioning HEP for patient awareness and comfort   Outcome: Progressing     Problem: BALANCE  Goal: Demo. static sitting EOB for at least 2 mins. with Min. A support, alertness throughout and min. c/o discomfort   Outcome: Progressing

## 2023-10-18 LAB — BACTERIA UR CULT: NORMAL

## 2023-10-18 NOTE — SIGNIFICANT EVENT
Preliminary Cause of Death:  · Date and Time of Death DATE: 10/17/23 / TIME: 23:41   · Preliminary Cause of Death multi-system organ failure   · Comments    Paged by nursing that patient became asystole on the monitor. On physical exam, patient was pale without spontaneous movements and did not respond to physical or verbal stimuli. There was no response to name or painful nailbed pressure. Radial pulses absent, palpated fpr >60 seconds. There were absent cardiac and lung sounds on all ausculatory fields. Pupils were fixed, dilated, and nonreactive bilaterally. Absent Gag and Corneal reflexes.    Patient pronounced dead at 23:41 on 10/17/23 .    Family notified by nursing staff.    Kam Caban DO

## 2023-10-21 LAB
BACTERIA BLD CULT: NORMAL
BACTERIA BLD CULT: NORMAL

## 2023-10-26 NOTE — DISCHARGE SUMMARY
Discharge Diagnosis  Right hemiplegia (CMS/[facility])    Hospital Course   Lauren Reyna is a 89 y.o. 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     Patient resides at [facility]. Per her son she was in her usual state of health until yesterday morning when she began acting confused. Normally she is able to converse and is AOx3. She is wheelchair bound and requires assistance for transfers. This morning she was found to be somnolent and with what appeared to be R sided paralysis. She was brought to the ED. Her NIH score was 25 in the ED. She was out of the window for thrombolytics. On my interview she is extremely somnolent and only opens eyes to voice then immediately goes back to sleep.      I did confirm with patients son Félix patient is DNR/DNI.      10/15: Patient is more awake and alert today however she is still unable to follow commands or engage in meaningful speech.       10/16: She remains encephalopathic. Overnight she desaturated and was found to be volume overloaded. She was started on IV lasix. I broached the topic of hospice with patients family. They were receptive and will discuss with other family and get back to me.      10/17: Patient family has elected for her to change her code status to DNRCC and pursue hospice. I have placed the hospice order set. SW will choice family today. Plan will be for her to go back to [facility]. I spent 35 minutes discussing advanced care planning with patient family.     Acute L PCA Distribution Infarct  AOC HFrEF (heart failure with reduced ejection fraction)  Acute Hypoxemic Respiratory Failure  Paroxysmal atrial fibrillation (CMS/HCC)  Coronary artery disease involving native  "coronary artery of native heart without angina pectoris  Type 2 diabetes mellitus without complication, without long-term current use of insulin (CMS/HCC)  Centrilobular emphysema (CMS/HCC)  Acquired hypothyroidism  Major depressive disorder (CMS/HCC)  Gastroesophageal reflux disease without esophagitis  Overactive bladder     Disposition   -Patients family has changed her code status to DNRCC and are transitioning to hospice.            Per Dr Caban: \" Patient pronounced dead at 23:41 on 10/17/23 . \"               Radhames Ahuja MD PhD  "